# Patient Record
Sex: FEMALE | Race: WHITE | Employment: OTHER | URBAN - METROPOLITAN AREA
[De-identification: names, ages, dates, MRNs, and addresses within clinical notes are randomized per-mention and may not be internally consistent; named-entity substitution may affect disease eponyms.]

---

## 2017-02-10 ENCOUNTER — OFFICE VISIT (OUTPATIENT)
Dept: INTERNAL MEDICINE CLINIC | Age: 62
End: 2017-02-10

## 2017-02-10 VITALS
DIASTOLIC BLOOD PRESSURE: 80 MMHG | SYSTOLIC BLOOD PRESSURE: 124 MMHG | HEIGHT: 60 IN | RESPIRATION RATE: 16 BRPM | BODY MASS INDEX: 28.66 KG/M2 | TEMPERATURE: 98 F | HEART RATE: 77 BPM | WEIGHT: 146 LBS | OXYGEN SATURATION: 98 %

## 2017-02-10 DIAGNOSIS — J40 BRONCHITIS: Primary | ICD-10-CM

## 2017-02-10 RX ORDER — PREDNISONE 20 MG/1
20 TABLET ORAL
Qty: 5 TAB | Refills: 0 | Status: SHIPPED | OUTPATIENT
Start: 2017-02-10 | End: 2017-10-12 | Stop reason: ALTCHOICE

## 2017-02-10 RX ORDER — AZITHROMYCIN 250 MG/1
TABLET, FILM COATED ORAL
Qty: 6 TAB | Refills: 0 | Status: SHIPPED | OUTPATIENT
Start: 2017-02-10 | End: 2017-02-15

## 2017-02-10 NOTE — PROGRESS NOTES
Primary Care Doctor is:  Luis Malcolm MD    Cold Symptoms (productive cough, sore throat, ear pain, sinus pain and fatigue x 3 months )         HPI:  David Castro is a 64y.o. year old female who is here for an acute care visit and has the following concerns:    Symptoms started coughing up mucous. About December maybe September 2016. Mucous chest (points to upper chest) saw Dr. Brandi Buchanan in December. Now having congestion in sinuses. Pressure. No pain in sinuses. No fever. Sputum is yellow to green. Not coughing all night. Not a smoker. No hx of asthma.    meloxicam that she uses for her knee helps with the constant coughing. Tired of being sick. Assessment and Plan        1. Bronchitis  Persistent cough with green sputum,  Lungs on exam show no rales but decreased air entry with hyperreactivity inducing coughing spasms. 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 No fever or night sweats, but throat is sore. - azithromycin (ZITHROMAX) 250 mg tablet; TAKE 2 TABS THE FIRST DAY, AND THEN 1 TAB DAILY FOR 4 MORE DAYS  Dispense: 6 Tab; Refill: 0  - predniSONE (DELTASONE) 20 mg tablet; Take 1 Tab by mouth daily (with breakfast). Dispense: 5 Tab;  Refill: 0      Visit Vitals    /80 (BP 1 Location: Left arm, BP Patient Position: Sitting)    Pulse 77    Temp 98 °F (36.7 °C) (Oral)    Resp 16    Ht 5' (1.524 m)    Wt 146 lb (66.2 kg)    LMP 03/09/2010    SpO2 98%    BMI 28.51 kg/m2       Historical Data    Past Medical History   Diagnosis Date    Cervical polyp      no HPV    Genital herpes      in past,no flare up    Tuberculosis      TOOK FULL COURSE OF INH    Tumor cells        Past Surgical History   Procedure Laterality Date    Hx dilation and curettage       FOR UTERINE HEMORRHAGE    Hx gyn  5/2003     colposcopy       Outpatient Encounter Prescriptions as of 2/10/2017   Medication Sig Dispense Refill    azithromycin (ZITHROMAX) 250 mg tablet TAKE 2 TABS THE FIRST DAY, AND THEN 1 TAB DAILY FOR 4 MORE DAYS 6 Tab 0    predniSONE (DELTASONE) 20 mg tablet Take 1 Tab by mouth daily (with breakfast). 5 Tab 0    meloxicam (MOBIC) 15 mg tablet Take 15 mg by mouth daily.  LORATADINE (CLARITIN PO) Take  by mouth.  acetaminophen (TYLENOL EX STR ARTHRITIS PAIN) 500 mg tablet Take  by mouth every six (6) hours as needed.  trimethoprim-sulfamethoxazole (BACTRIM DS) 160-800 mg per tablet Take 1 tablet by mouth two (2) times a day.  magic mouthwash solution Magic mouth wash   Maalox  Lidocaine 2% viscous   Diphenhydramine oral solution     Pharmacy to mix equal portions of ingredients to a total volume as indicated in the dispense amount. 150 mL 0    traMADol (ULTRAM) 50 mg tablet Take 1 Tab by mouth nightly as needed for Pain. 30 Tab 0     No facility-administered encounter medications on file as of 2/10/2017. Allergies   Allergen Reactions    Aspirin Hives     But can take advil    Penicillins Rash    Percocet [Oxycodone-Acetaminophen] Unknown (comments)        Social History     Social History    Marital status: LEGALLY      Spouse name: N/A    Number of children: N/A    Years of education: N/A     Occupational History    Not on file. Social History Main Topics    Smoking status: Former Smoker     Packs/day: 1.00     Quit date: 5/4/2007    Smokeless tobacco: Never Used    Alcohol use Yes      Comment: SOCIAL    Drug use: No    Sexual activity: No     Other Topics Concern    Not on file     Social History Narrative        Review of Systems   Constitutional: Positive for chills. Negative for diaphoresis, fever, malaise/fatigue and weight loss. HENT: Positive for congestion and sore throat. Negative for ear discharge and ear pain. Eyes: Negative for blurred vision. Respiratory: Positive for cough, sputum production and wheezing. Negative for shortness of breath. Cardiovascular: Negative for chest pain and palpitations. Gastrointestinal: Negative for abdominal pain, nausea and vomiting. Genitourinary: Negative. Musculoskeletal: Negative. Negative for myalgias. Skin: Negative for itching and rash. Neurological: Negative for dizziness, focal weakness, weakness and headaches. Endo/Heme/Allergies: Negative for polydipsia. Physical Exam   Constitutional: She is oriented to person, place, and time and well-developed, well-nourished, and in no distress. No distress. HENT:   Right Ear: External ear normal.   Left Ear: External ear normal.   Mouth/Throat: Oropharyngeal exudate present. Nasal congestion present   Eyes: Conjunctivae are normal. Right eye exhibits no discharge. Left eye exhibits no discharge. No scleral icterus. Neck: Normal range of motion. Neck supple. No tracheal deviation present. No thyromegaly present. Cardiovascular: Normal rate and regular rhythm. Exam reveals no gallop and no friction rub. Pulmonary/Chest: Effort normal and breath sounds normal. No respiratory distress (decreased air entry). She has no wheezes. She has no rales. Abdominal: Soft. Bowel sounds are normal. She exhibits no distension. Musculoskeletal: Normal range of motion. She exhibits no edema or tenderness. Lymphadenopathy:     She has no cervical adenopathy (slight cervical neck tenderness but no discrete lymph nodes). Neurological: She is alert and oriented to person, place, and time. No cranial nerve deficit. Skin: Skin is warm and dry. No rash noted. Psychiatric: Memory and affect normal.   Vitals reviewed. Orders Placed This Encounter    azithromycin (ZITHROMAX) 250 mg tablet     Sig: TAKE 2 TABS THE FIRST DAY, AND THEN 1 TAB DAILY FOR 4 MORE DAYS     Dispense:  6 Tab     Refill:  0    predniSONE (DELTASONE) 20 mg tablet     Sig: Take 1 Tab by mouth daily (with breakfast).      Dispense:  5 Tab     Refill:  0        I have reviewed the patient's medical history in detail and updated the computerized patient record. We had a prolonged discussion about these complex clinical issues and went over the various important aspects to consider. All questions were answered. Advised her to call back or return to office if symptoms do not improve, change in nature, or persist.    She was given an after visit summary or informed of [facility] Access which includes patient instructions, diagnoses, current medications, & vitals. She expressed understanding with the diagnosis and plan.

## 2017-02-10 NOTE — PROGRESS NOTES
1. Have you been to the ER, urgent care clinic since your last visit? Hospitalized since your last visit? No    2. Have you seen or consulted any other health care providers outside of the [facility] since your last visit? Include any pap smears or colon screening.  No  Chief Complaint   Patient presents with    Cold Symptoms     productive cough, sore throat, ear pain, sinus pain and fatigue x 3 months

## 2017-04-21 DIAGNOSIS — Z01.419 WELL WOMAN EXAM: Primary | ICD-10-CM

## 2017-04-24 ENCOUNTER — HOSPITAL ENCOUNTER (OUTPATIENT)
Dept: MAMMOGRAPHY | Age: 62
Discharge: HOME | End: 2017-04-24
Attending: INTERNAL MEDICINE
Payer: COMMERCIAL

## 2017-04-24 DIAGNOSIS — Z12.31 VISIT FOR SCREENING MAMMOGRAM: ICD-10-CM

## 2017-04-24 PROCEDURE — 77067 SCR MAMMO BI INCL CAD: CPT

## 2017-10-12 ENCOUNTER — OFFICE VISIT (OUTPATIENT)
Dept: INTERNAL MEDICINE CLINIC | Age: 62
End: 2017-10-12

## 2017-10-12 VITALS
TEMPERATURE: 98.4 F | RESPIRATION RATE: 14 BRPM | WEIGHT: 148.2 LBS | OXYGEN SATURATION: 96 % | SYSTOLIC BLOOD PRESSURE: 120 MMHG | DIASTOLIC BLOOD PRESSURE: 80 MMHG | HEIGHT: 60 IN | BODY MASS INDEX: 29.09 KG/M2 | HEART RATE: 88 BPM

## 2017-10-12 DIAGNOSIS — M46.1 SI (SACROILIAC) JOINT INFLAMMATION (HCC): Primary | ICD-10-CM

## 2017-10-12 DIAGNOSIS — M70.61 TROCHANTERIC BURSITIS OF RIGHT HIP: ICD-10-CM

## 2017-10-12 NOTE — PROGRESS NOTES
59 yo female reports pain in low back as well as \"all over\". She was seen twice late last year for R shoulder pain and once for R knee pain. She saw an Ortho, Dr. Vanessa Grullon, who had her on Meloxicam 15 mg; she still had some of these, and has taken them sporadically. She indicates her current discomfort is R low back/sacral region, R lateral hip and into RLE. 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 The discomfort is interfering with good sleep at night. She is taking Turmuric, but was told by the pharmacist not to use Meloxicam and Turmeric at the same time. PE: WNWD WF   BP - 120/80   Hands - mild deformity of both thumb and 2nd metacarpal joints   SLR is neg   R hip - tender over trochanteric bursa   SLR is neg   Low back - point tender over R SI Joint   DTRs - diminished in L knee    Imp: R SI Joint Inflammation   R Trochanteric Bursitis     Plan: Meloxicam 15 mg - she has 8 pills left from the previous rx   Exercises for both problems   Heat or Ice   To Ortho if no improvement  _________________________  Expected course of current diagnosed problem(s) as well as expected progression and possible complications, and desired follow up with provider are discussed with patient. Patient is encouraged to be back in touch with any questions or concerns. Patient expresses understanding of plan of care. Patient is given AVS which includes diagnoses, current medications, vitals.

## 2017-10-12 NOTE — PATIENT INSTRUCTIONS
Hip Bursitis: Care Instructions  Your Care Instructions    Bursitis is inflammation of the bursa. A bursa is a small sac of fluid that cushions a joint and helps it move easily. A bursa sits between a bone in the hip and the muscles and tendons in the thigh and buttock. Injury or overuse of the hip can cause bursitis. Activities that can lead to bursitis include twisting and rapid joint movement. Bursitis can cause hip pain. Bursitis usually gets better if you avoid the activity that caused it. If pain lasts or gets worse despite home treatment, your doctor may draw fluid from the bursa through a needle. This may relieve your pain and help your doctor know if you have an infection. If so, your doctor will prescribe antibiotics. If you have inflammation only, you may get a corticosteroid shot to reduce swelling and pain. Sometimes surgery is needed to drain or remove the bursa. Follow-up care is a key part of your treatment and safety. Be sure to make and go to all appointments, and call your doctor if you are having problems. It's also a good idea to know your test results and keep a list of the medicines you take. How can you care for yourself at home? · Put ice or a cold pack on your hip for 10 to 20 minutes at a time. Put a thin cloth between the ice and your skin. · After 3 days of using ice, you may use heat on your hip. You can use a hot water bottle, a heating pad set on low, or a warm, moist towel. · Rest your hip. Stop any activities that cause pain. Switch to activities that do not stress your hip. · Take your medicines exactly as prescribed. Call your doctor if you think you are having a problem with your medicine. · Ask your doctor if you can take an over-the-counter pain medicine, such as acetaminophen (Tylenol), ibuprofen (Advil, Motrin), or naproxen (Aleve). Be safe with medicines. Read and follow all instructions on the label.   · To prevent stiffness, gently move the hip joint as much as you can without pain every day. As the pain gets better, keep doing range-of-motion exercises. Ask your doctor for exercises that will make the muscles around the hip joint stronger. Do these as directed. · You can slowly return to the activity that caused the pain, but do it with less effort until you can do it without pain or swelling. Be sure to warm up before and stretch after you do the activity. When should you call for help? Call your doctor now or seek immediate medical care if:  · You have a fever. · You have increased swelling or redness in your hip. · You cannot use your hip, or the pain in your hip gets worse. Watch closely for changes in your health, and be sure to contact your doctor if:  · You have pain for 2 weeks or longer despite home treatment. Where can you learn more? Go to http://darek-estuardo.info/. Enter Y024 in the search box to learn more about \"Hip Bursitis: Care Instructions. \"  Current as of: March 21, 2017  Content Version: 11.3  © 7237-9993 [employer]. Care instructions adapted under license by [employer] (which disclaims liability or warranty for this information). If you have questions about a medical condition or this instruction, always ask your healthcare professional. [address] any warranty or liability for your use of this information. _____________________________________________     Hip Bursitis: Exercises  Your Care Instructions  Here are some examples of typical rehabilitation exercises for your condition. Start each exercise slowly. Ease off the exercise if you start to have pain. Your doctor or physical therapist will tell you when you can start these exercises and which ones will work best for you. How to do the exercises  Hip rotator stretch    1. Lie on your back with both knees bent and your feet flat on the floor.   2. Put the ankle of your affected leg on your opposite thigh near your knee.  3. Use your hand to gently push your knee away from your body until you feel a gentle stretch around your hip. 4. Hold the stretch for 15 to 30 seconds. 5. Repeat 2 to 4 times. 6. Repeat steps 1 through 5, but this time use your hand to gently pull your knee toward your opposite shoulder. Iliotibial band stretch    1. Lean sideways against a wall. If you are not steady on your feet, hold on to a chair or counter. 2. Stand on the leg with the affected hip, with that leg close to the wall. Then cross your other leg in front of it. 3. Let your affected hip drop out to the side of your body and against wall. Then lean away from your affected hip until you feel a stretch. 4. Hold the stretch for 15 to 30 seconds. 5. Repeat 2 to 4 times. Straight-leg raises to the outside    1. Lie on your side, with your affected hip on top. 2. Tighten the front thigh muscles of your top leg to keep your knee straight. 3. Keep your hip and your leg straight in line with the rest of your body, and keep your knee pointing forward. Do not drop your hip back. 4. Lift your top leg straight up toward the ceiling, about 12 inches off the floor. Hold for about 6 seconds, then slowly lower your leg. 5. Repeat 8 to 12 times. Clamshell    1. Lie on your side, with your affected hip on top and your head propped on a pillow. Keep your feet and knees together and your knees bent. 2. Raise your top knee, but keep your feet together. Do not let your hips roll back. Your legs should open up like a clamshell. 3. Hold for 6 seconds. 4. Slowly lower your knee back down. Rest for 10 seconds. 5. Repeat 8 to 12 times. Follow-up care is a key part of your treatment and safety. Be sure to make and go to all appointments, and call your doctor if you are having problems. It's also a good idea to know your test results and keep a list of the medicines you take. Where can you learn more?   Go to http://darek-estuardo.info/. Enter U793 in the search box to learn more about \"Hip Bursitis: Exercises. \"  Current as of: March 21, 2017  Content Version: 11.3  © 2006-2017 [employer]. Care instructions adapted under license by [employer] (which disclaims liability or warranty for this information). If you have questions about a medical condition or this instruction, always ask your healthcare professional. [place] [address] any warranty or liability for your use of this information. _______________________________________________     Sacroiliac Joint Pain: Care Instructions  Your Care Instructions    The sacroiliac joints connect the spine and each side of the pelvis. These joints bear the weight and stress of your torso. This makes them easy to injure. Injury or overuse of these joints may cause low back pain. Stress on these joints can cause joint pain. Sacroiliac joint pain is more common in pregnant women. Certain kinds of arthritis also may cause this type of joint pain. Home treatment may help you feel better. So can avoiding activities that stress your back. Your doctor also may recommend physical therapy. This may include doing exercises and stretches to help with pain. You may also learn to use good posture. Follow-up care is a key part of your treatment and safety. Be sure to make and go to all appointments, and call your doctor if you are having problems. It's also a good idea to know your test results and keep a list of the medicines you take. How can you care for yourself at home? · Ask your doctor about light exercises that may help your back pain. Try to do light activity throughout the day. But make sure to take rests as needed. Find a comfortable position for rest, but don't stay in one position for too long. Avoid activities that cause pain.   · To apply heat, put a warm water bottle, a heating pad set on low, or a warm cloth on your back. Do not go to sleep with a heating pad on your skin. · Put ice or a cold pack on your back for 10 to 20 minutes at a time. Put a thin cloth between the ice and your skin. · If the doctor gave you a prescription medicine for pain, take it as prescribed. · If you are not taking a prescription pain medicine, ask your doctor if you can take an over-the-counter pain medicine, such as acetaminophen (Tylenol), ibuprofen (Advil, Motrin), or naproxen (Aleve). Read and follow all instructions on the label. Take pain medicines exactly as directed. · Do not take two or more pain medicines at the same time unless the doctor told you to. Many pain medicines have acetaminophen, which is Tylenol. Too much acetaminophen (Tylenol) can be harmful. · To prevent future back pain, do exercises to stretch and strengthen your back and stomach. Learn how to use good posture, safe lifting techniques, and proper body mechanics. When should you call for help? Call 911 anytime you think you may need emergency care. For example, call if:  · You are unable to move a leg at all. Call your doctor now or seek immediate medical care if:  · You have new or worse symptoms in your legs or buttocks. Symptoms may include:  ¨ Numbness or tingling. ¨ Weakness. ¨ Pain. · You lose bladder or bowel control. Watch closely for changes in your health, and be sure to contact your doctor if:  · You are not getting better as expected. Where can you learn more? Go to http://darek-estuardo.info/. Enter Q710 in the search box to learn more about \"Sacroiliac Joint Pain: Care Instructions. \"  Current as of: March 21, 2017  Content Version: 11.3  © 0563-7123 [employer]. Care instructions adapted under license by [employer] (which disclaims liability or warranty for this information).  If you have questions about a medical condition or this instruction, always ask your healthcare professional. [employer] disclaims any warranty or liability for your use of this information. ____________________________________________     Sacroiliac Pain: Exercises  Your Care Instructions  Here are some examples of typical rehabilitation exercises for your condition. Start each exercise slowly. Ease off the exercise if you start to have pain. Your doctor or physical therapist will tell you when you can start these exercises and which ones will work best for you. How to do the exercises  Knee-to-chest stretch    Do not do the knee-to-chest exercise if it causes or increases back or leg pain. 7. Lie on your back with your knees bent and your feet flat on the floor. You can put a small pillow under your head and neck if it is more comfortable. 8. Grasp your hands under one knee and bring the knee to your chest, keeping the other foot flat on the floor. 9. Keep your lower back pressed to the floor. Hold for at least 15 to 30 seconds. 10. Relax and lower the knee to the starting position. Repeat with the other leg. 11. Repeat 2 to 4 times with each leg. 12. To get more stretch, keep your other leg flat on the floor while pulling your knee to your chest.  Bridging    6. Lie on your back with both knees bent. Your knees should be bent about 90 degrees. 7. Tighten your belly muscles by pulling in your belly button toward your spine. Then push your feet into the floor, squeeze your buttocks, and lift your hips off the floor until your shoulders, hips, and knees are all in a straight line. 8. Hold for about 6 seconds as you continue to breathe normally, and then slowly lower your hips back down to the floor and rest for up to 10 seconds. 9. Repeat 8 to 12 times. Hip extension    6. Get down on your hands and knees on the floor. 7. Keeping your back and neck straight, lift one leg straight out behind you. When you lift your leg, keep your hips level.  Don't let your back twist, and don't let your hip drop toward the floor.  8. Hold for 6 seconds. Repeat 8 to 12 times with each leg. 9. If you feel steady and strong when you do this exercise, you can make it more difficult. To do this, when you lift your leg, also lift the opposite arm straight out in front of you. For example, lift the left leg and the right arm at the same time. (This is sometimes called the \"bird dog exercise. \") Hold for 6 seconds, and repeat 8 to 12 times on each side. Clamshell    6. Lie on your side with a pillow under your head. Keep your feet and knees together and your knees bent. 7. Raise your top knee, but keep your feet together. Do not let your hips roll back. Your legs should open up like a clamshell. 8. Hold for 6 seconds. 9. Slowly lower your knee back down. Rest for 10 seconds. 10. Repeat 8 to 12 times. 11. Switch to your other side and repeat steps 1 through 5. Hamstring wall stretch    1. Lie on your back in a doorway, with one leg through the open door. 2. Slide your affected leg up the wall to straighten your knee. You should feel a gentle stretch down the back of your leg. ¨ Do not arch your back. ¨ Do not bend either knee. ¨ Keep one heel touching the floor and the other heel touching the wall. Do not point your toes. 3. Hold the stretch for at least 1 minute to begin. Then try to lengthen the time you hold the stretch to as long as 6 minutes. 4. Switch legs, and repeat steps 1 through 3.  5. Repeat 2 to 4 times. If you do not have a place to do this exercise in a doorway, there is another way to do it:  1. Lie on your back, and bend one knee. 2. Loop a towel under the ball and toes of that foot, and hold the ends of the towel in your hands. 3. Straighten your knee, and slowly pull back on the towel. You should feel a gentle stretch down the back of your leg. 4. Switch legs, and repeat steps 1 through 3.  5. Repeat 2 to 4 times. Lower abdominal strengthening    1.  Lie on your back with your knees bent and your feet flat on the floor. 2. Tighten your belly muscles by pulling your belly button in toward your spine. 3. Lift one foot off the floor and bring your knee toward your chest, so that your knee is straight above your hip and your leg is bent like the letter \"L. \"  4. Lift the other knee up to the same position. 5. Lower one leg at a time to the starting position. 6. Keep alternating legs until you have lifted each leg 8 to 12 times. 7. Be sure to keep your belly muscles tight and your back still as you are moving your legs. Be sure to breathe normally. Piriformis stretch    1. Lie on your back with your legs straight. 2. Lift your affected leg, and bend your knee. With your opposite hand, reach across your body, and then gently pull your knee toward your opposite shoulder. 3. Hold the stretch for 15 to 30 seconds. 4. Switch legs and repeat steps 1 through 3.  5. Repeat 2 to 4 times. Follow-up care is a key part of your treatment and safety. Be sure to make and go to all appointments, and call your doctor if you are having problems. It's also a good idea to know your test results and keep a list of the medicines you take. Where can you learn more? Go to http://darek-estuardo.info/. Enter G403 in the search box to learn more about \"Sacroiliac Pain: Exercises. \"  Current as of: March 21, 2017  Content Version: 11.3  © 8042-7082 [employer]. Care instructions adapted under license by [employer] (which disclaims liability or warranty for this information). If you have questions about a medical condition or this instruction, always ask your healthcare professional. [place] [address] any warranty or liability for your use of this information.

## 2017-10-12 NOTE — LETTER
NOTIFICATION RETURN TO WORK / SCHOOL 
 
10/12/2017 3:32 PM 
 
Ms. Johnnie Aguilar [address] [id number] To Whom It May Concern: 
 
Johnnie Aguilar is currently under the care of Fabio Aayla. She will return to work/school on: Fahad, October 15, 2017. If there are questions or concerns please have the patient contact our office. Sincerely, Jcalros Mclaughlin NP

## 2017-10-12 NOTE — MR AVS SNAPSHOT
Visit Information Date & Time Provider Department Dept. Phone Encounter #  
 10/12/2017  2:40 PM Janice Marte NP [place] [id number] [facility] [phone] [id number] Upcoming Health Maintenance Date Due Hepatitis C Screening 1955 DTaP/Tdap/Td series (1 - Tdap) 4/18/1976 PAP AKA CERVICAL CYTOLOGY 12/1/2018 BREAST CANCER SCRN MAMMOGRAM 4/24/2019 COLONOSCOPY 6/30/2027 Allergies as of 10/12/2017  Review Complete On: 10/12/2017 By: Janice Marte NP Severity Noted Reaction Type Reactions Percocet [Oxycodone-acetaminophen] Medium 05/04/2009   Side Effect Rash After being on it for a month Aspirin  05/04/2009    Hives But can take advil Penicillins  10/07/2009    Rash Current Immunizations  Never Reviewed No immunizations on file. Not reviewed this visit You Were Diagnosed With   
  
 Codes Comments SI (sacroiliac) joint inflammation (HCC)    -  Primary ICD-10-CM: M46.1 ICD-9-CM: 720.2 Trochanteric bursitis of right hip     ICD-10-CM: M70.61 ICD-9-CM: 726.5 Vitals BP Pulse Temp Resp Height(growth percentile) Weight(growth percentile) 120/80 (BP 1 Location: Left arm, BP Patient Position: Sitting) 88 98.4 °F (36.9 °C) (Oral) 14 5' (1.524 m) 148 lb 3.2 oz (67.2 kg) LMP SpO2 BMI OB Status Smoking Status 03/09/2010 96% 28.94 kg/m2 Postmenopausal Former Smoker BMI and BSA Data Body Mass Index Body Surface Area  
 28.94 kg/m 2 1.69 m 2 Preferred Pharmacy Pharmacy Name Phone [id number] [place] [address] [phone] Your Updated Medication List  
  
   
This list is accurate as of: 10/12/17  3:32 PM.  Always use your most recent med list.  
  
  
  
  
 meloxicam 15 mg tablet Commonly known as:  MOBIC Take 15 mg by mouth daily. TURMERIC ROOT EXTRACT PO Take  by mouth. TYLENOL EX STR ARTHRITIS PAIN 500 mg tablet Generic drug:  acetaminophen Take  by mouth every six (6) hours as needed. WOMEN'S DAILY MULTIVITAMIN PO Take  by mouth. Patient Instructions Hip Bursitis: Care Instructions Your Care Instructions Bursitis is inflammation of the bursa. A bursa is a small sac of fluid that cushions a joint and helps it move easily. A bursa sits between a bone in the hip and the muscles and tendons in the thigh and buttock. Injury or overuse of the hip can cause bursitis. Activities that can lead to bursitis include twisting and rapid joint movement. Bursitis can cause hip pain. Bursitis usually gets better if you avoid the activity that caused it. If pain lasts or gets worse despite home treatment, your doctor may draw fluid from the bursa through a needle. This may relieve your pain and help your doctor know if you have an infection. If so, your doctor will prescribe antibiotics. If you have inflammation only, you may get a corticosteroid shot to reduce swelling and pain. Sometimes surgery is needed to drain or remove the bursa. Follow-up care is a key part of your treatment and safety. Be sure to make and go to all appointments, and call your doctor if you are having problems. It's also a good idea to know your test results and keep a list of the medicines you take. How can you care for yourself at home? · Put ice or a cold pack on your hip for 10 to 20 minutes at a time. Put a thin cloth between the ice and your skin. · After 3 days of using ice, you may use heat on your hip. You can use a hot water bottle, a heating pad set on low, or a warm, moist towel. · Rest your hip. Stop any activities that cause pain. Switch to activities that do not stress your hip. · Take your medicines exactly as prescribed. Call your doctor if you think you are having a problem with your medicine. · Ask your doctor if you can take an over-the-counter pain medicine, such as acetaminophen (Tylenol), ibuprofen (Advil, Motrin), or naproxen (Aleve). Be safe with medicines. Read and follow all instructions on the label. · To prevent stiffness, gently move the hip joint as much as you can without pain every day. As the pain gets better, keep doing range-of-motion exercises. Ask your doctor for exercises that will make the muscles around the hip joint stronger. Do these as directed. · You can slowly return to the activity that caused the pain, but do it with less effort until you can do it without pain or swelling. Be sure to warm up before and stretch after you do the activity. When should you call for help? Call your doctor now or seek immediate medical care if: 
· You have a fever. · You have increased swelling or redness in your hip. · You cannot use your hip, or the pain in your hip gets worse. Watch closely for changes in your health, and be sure to contact your doctor if: 
· You have pain for 2 weeks or longer despite home treatment. Where can you learn more? Go to http://darek-estuardo.info/. Enter A688 in the search box to learn more about \"Hip Bursitis: Care Instructions. \" Current as of: March 21, 2017 Content Version: 11.3 © 0307-3283 [employer]. Care instructions adapted under license by [employer] (which disclaims liability or warranty for this information). If you have questions about a medical condition or this instruction, always ask your healthcare professional. [address] any warranty or liability for your use of this information. _____________________________________________ Hip Bursitis: Exercises Your Care Instructions Here are some examples of typical rehabilitation exercises for your condition. Start each exercise slowly. Ease off the exercise if you start to have pain. Your doctor or physical therapist will tell you when you can start these exercises and which ones will work best for you. How to do the exercises Hip rotator stretch 1. Lie on your back with both knees bent and your feet flat on the floor. 2. Put the ankle of your affected leg on your opposite thigh near your knee. 3. Use your hand to gently push your knee away from your body until you feel a gentle stretch around your hip. 4. Hold the stretch for 15 to 30 seconds. 5. Repeat 2 to 4 times. 6. Repeat steps 1 through 5, but this time use your hand to gently pull your knee toward your opposite shoulder. Iliotibial band stretch 1. Lean sideways against a wall. If you are not steady on your feet, hold on to a chair or counter. 2. Stand on the leg with the affected hip, with that leg close to the wall. Then cross your other leg in front of it. 3. Let your affected hip drop out to the side of your body and against wall. Then lean away from your affected hip until you feel a stretch. 4. Hold the stretch for 15 to 30 seconds. 5. Repeat 2 to 4 times. Straight-leg raises to the outside 1. Lie on your side, with your affected hip on top. 2. Tighten the front thigh muscles of your top leg to keep your knee straight. 3. Keep your hip and your leg straight in line with the rest of your body, and keep your knee pointing forward. Do not drop your hip back. 4. Lift your top leg straight up toward the ceiling, about 12 inches off the floor. Hold for about 6 seconds, then slowly lower your leg. 5. Repeat 8 to 12 times. Clamshell 1. Lie on your side, with your affected hip on top and your head propped on a pillow. Keep your feet and knees together and your knees bent. 2. Raise your top knee, but keep your feet together. Do not let your hips roll back. Your legs should open up like a clamshell. 3. Hold for 6 seconds. 4. Slowly lower your knee back down. Rest for 10 seconds. 5. Repeat 8 to 12 times. Follow-up care is a key part of your treatment and safety. Be sure to make and go to all appointments, and call your doctor if you are having problems. It's also a good idea to know your test results and keep a list of the medicines you take. Where can you learn more? Go to http://darek-estuardo.info/. Enter P625 in the search box to learn more about \"Hip Bursitis: Exercises. \" Current as of: March 21, 2017 Content Version: 11.3 © 2006-2017 [employer]. Care instructions adapted under license by [employer] (which disclaims liability or warranty for this information). If you have questions about a medical condition or this instruction, always ask your healthcare professional. [address] any warranty or liability for your use of this information. _______________________________________________ Sacroiliac Joint Pain: Care Instructions Your Care Instructions The sacroiliac joints connect the spine and each side of the pelvis. These joints bear the weight and stress of your torso. This makes them easy to injure. Injury or overuse of these joints may cause low back pain. Stress on these joints can cause joint pain. Sacroiliac joint pain is more common in pregnant women. Certain kinds of arthritis also may cause this type of joint pain. Home treatment may help you feel better. So can avoiding activities that stress your back. Your doctor also may recommend physical therapy. This may include doing exercises and stretches to help with pain. You may also learn to use good posture. Follow-up care is a key part of your treatment and safety. Be sure to make and go to all appointments, and call your doctor if you are having problems. It's also a good idea to know your test results and keep a list of the medicines you take. How can you care for yourself at home? · Ask your doctor about light exercises that may help your back pain.  Try to do light activity throughout the day. But make sure to take rests as needed. Find a comfortable position for rest, but don't stay in one position for too long. Avoid activities that cause pain. · To apply heat, put a warm water bottle, a heating pad set on low, or a warm cloth on your back. Do not go to sleep with a heating pad on your skin. · Put ice or a cold pack on your back for 10 to 20 minutes at a time. Put a thin cloth between the ice and your skin. · If the doctor gave you a prescription medicine for pain, take it as prescribed. · If you are not taking a prescription pain medicine, ask your doctor if you can take an over-the-counter pain medicine, such as acetaminophen (Tylenol), ibuprofen (Advil, Motrin), or naproxen (Aleve). Read and follow all instructions on the label. Take pain medicines exactly as directed. · Do not take two or more pain medicines at the same time unless the doctor told you to. Many pain medicines have acetaminophen, which is Tylenol. Too much acetaminophen (Tylenol) can be harmful. · To prevent future back pain, do exercises to stretch and strengthen your back and stomach. Learn how to use good posture, safe lifting techniques, and proper body mechanics. When should you call for help? Call 911 anytime you think you may need emergency care. For example, call if: 
· You are unable to move a leg at all. Call your doctor now or seek immediate medical care if: 
· You have new or worse symptoms in your legs or buttocks. Symptoms may include: ¨ Numbness or tingling. ¨ Weakness. ¨ Pain. · You lose bladder or bowel control. Watch closely for changes in your health, and be sure to contact your doctor if: 
· You are not getting better as expected. Where can you learn more? Go to http://darek-estuardo.info/. Enter D717 in the search box to learn more about \"Sacroiliac Joint Pain: Care Instructions. \" Current as of: March 21, 2017 Content Version: 11.3 © 2269-5075 [employer]. Care instructions adapted under license by [employer] (which disclaims liability or warranty for this information). If you have questions about a medical condition or this instruction, always ask your healthcare professional. [address] any warranty or liability for your use of this information. ____________________________________________ Sacroiliac Pain: Exercises Your Care Instructions Here are some examples of typical rehabilitation exercises for your condition. Start each exercise slowly. Ease off the exercise if you start to have pain. Your doctor or physical therapist will tell you when you can start these exercises and which ones will work best for you. How to do the exercises Knee-to-chest stretch Do not do the knee-to-chest exercise if it causes or increases back or leg pain. 7. Lie on your back with your knees bent and your feet flat on the floor. You can put a small pillow under your head and neck if it is more comfortable. 8. Grasp your hands under one knee and bring the knee to your chest, keeping the other foot flat on the floor. 9. Keep your lower back pressed to the floor. Hold for at least 15 to 30 seconds. 10. Relax and lower the knee to the starting position. Repeat with the other leg. 11. Repeat 2 to 4 times with each leg. 12. To get more stretch, keep your other leg flat on the floor while pulling your knee to your chest. 
Bridging 6. Lie on your back with both knees bent. Your knees should be bent about 90 degrees. 7. Tighten your belly muscles by pulling in your belly button toward your spine. Then push your feet into the floor, squeeze your buttocks, and lift your hips off the floor until your shoulders, hips, and knees are all in a straight line. 8. Hold for about 6 seconds as you continue to breathe normally, and then slowly lower your hips back down to the floor and rest for up to 10 seconds. 9. Repeat 8 to 12 times. Hip extension 6. Get down on your hands and knees on the floor. 7. Keeping your back and neck straight, lift one leg straight out behind you. When you lift your leg, keep your hips level. Don't let your back twist, and don't let your hip drop toward the floor. 8. Hold for 6 seconds. Repeat 8 to 12 times with each leg. 9. If you feel steady and strong when you do this exercise, you can make it more difficult. To do this, when you lift your leg, also lift the opposite arm straight out in front of you. For example, lift the left leg and the right arm at the same time. (This is sometimes called the \"bird dog exercise. \") Hold for 6 seconds, and repeat 8 to 12 times on each side. Clamshell 6. Lie on your side with a pillow under your head. Keep your feet and knees together and your knees bent. 7. Raise your top knee, but keep your feet together. Do not let your hips roll back. Your legs should open up like a clamshell. 8. Hold for 6 seconds. 9. Slowly lower your knee back down. Rest for 10 seconds. 10. Repeat 8 to 12 times. 11. Switch to your other side and repeat steps 1 through 5. Hamstring wall stretch 1. Lie on your back in a doorway, with one leg through the open door. 2. Slide your affected leg up the wall to straighten your knee. You should feel a gentle stretch down the back of your leg. ¨ Do not arch your back. ¨ Do not bend either knee. ¨ Keep one heel touching the floor and the other heel touching the wall. Do not point your toes. 3. Hold the stretch for at least 1 minute to begin. Then try to lengthen the time you hold the stretch to as long as 6 minutes. 4. Switch legs, and repeat steps 1 through 3. 
5. Repeat 2 to 4 times. If you do not have a place to do this exercise in a doorway, there is another way to do it: 1. Lie on your back, and bend one knee. 2. Loop a towel under the ball and toes of that foot, and hold the ends of the towel in your hands. 3. Straighten your knee, and slowly pull back on the towel. You should feel a gentle stretch down the back of your leg. 4. Switch legs, and repeat steps 1 through 3. 
5. Repeat 2 to 4 times. Lower abdominal strengthening 1. Lie on your back with your knees bent and your feet flat on the floor. 2. Tighten your belly muscles by pulling your belly button in toward your spine. 3. Lift one foot off the floor and bring your knee toward your chest, so that your knee is straight above your hip and your leg is bent like the letter \"L. \" 
4. Lift the other knee up to the same position. 5. Lower one leg at a time to the starting position. 6. Keep alternating legs until you have lifted each leg 8 to 12 times. 7. Be sure to keep your belly muscles tight and your back still as you are moving your legs. Be sure to breathe normally. Piriformis stretch 1. Lie on your back with your legs straight. 2. Lift your affected leg, and bend your knee. With your opposite hand, reach across your body, and then gently pull your knee toward your opposite shoulder. 3. Hold the stretch for 15 to 30 seconds. 4. Switch legs and repeat steps 1 through 3. 
5. Repeat 2 to 4 times. Follow-up care is a key part of your treatment and safety. Be sure to make and go to all appointments, and call your doctor if you are having problems. It's also a good idea to know your test results and keep a list of the medicines you take. Where can you learn more? Go to http://darek-estuardo.info/. Enter H280 in the search box to learn more about \"Sacroiliac Pain: Exercises. \" Current as of: March 21, 2017 Content Version: 11.3 © 4857-3620 [employer]. Care instructions adapted under license by [employer] (which disclaims liability or warranty for this information).  If you have questions about a medical condition or this instruction, always ask your healthcare professional. [employer] disclaims any warranty or liability for your use of this information. Introducing [facility]! Julianne Rockwell introduces [employer] patient portal. Now you can access parts of your medical record, email your doctor's office, and request medication refills online. 1. In your internet browser, go to [email or web address] 2. Click on the First Time User? Click Here link in the Sign In box. You will see the New Member Sign Up page. 3. Enter your [employer] Access Code exactly as it appears below. You will not need to use this code after youve completed the sign-up process. If you do not sign up before the expiration date, you must request a new code. · [employer] Access Code: [email or web address] Expires: 1/10/2018  3:32 PM 
 
4. Enter the last four digits of your Social Security Number (xxxx) and Date of Birth (mm/dd/yyyy) as indicated and click Submit. You will be taken to the next sign-up page. 5. Create a [employer] ID. This will be your [employer] login ID and cannot be changed, so think of one that is secure and easy to remember. 6. Create a [employer] password. You can change your password at any time. 7. Enter your Password Reset Question and Answer. This can be used at a later time if you forget your password. 8. Enter your e-mail address. You will receive e-mail notification when new information is available in [address] 9. Click Sign Up. You can now view and download portions of your medical record. 10. Click the Download Summary menu link to download a portable copy of your medical information. If you have questions, please visit the Frequently Asked Questions section of the [employer] website. Remember, [employer] is NOT to be used for urgent needs. For medical emergencies, dial 911. Now available from your iPhone and Android! Please provide this summary of care documentation to your next provider.

## 2017-10-12 NOTE — PROGRESS NOTES
Chief Complaint   Patient presents with    Back Pain     Pt c/o lower back pain, but states is all over. 1. Have you been to the ER, urgent care clinic since your last visit? Hospitalized since your last visit? No    2. Have you seen or consulted any other health care providers outside of the [address] [place] [address] since your last visit? Include any pap smears or colon screening.  No]

## 2017-11-21 ENCOUNTER — OFFICE VISIT (OUTPATIENT)
Dept: INTERNAL MEDICINE CLINIC | Age: 62
End: 2017-11-21

## 2017-11-21 VITALS
RESPIRATION RATE: 18 BRPM | DIASTOLIC BLOOD PRESSURE: 80 MMHG | SYSTOLIC BLOOD PRESSURE: 120 MMHG | HEART RATE: 78 BPM | BODY MASS INDEX: 28.86 KG/M2 | HEIGHT: 60 IN | WEIGHT: 147 LBS | TEMPERATURE: 97.2 F | OXYGEN SATURATION: 98 %

## 2017-11-21 DIAGNOSIS — J34.89 NOSTRIL INFECTION: Primary | ICD-10-CM

## 2017-11-21 RX ORDER — SULFAMETHOXAZOLE AND TRIMETHOPRIM 800; 160 MG/1; MG/1
1 TABLET ORAL 2 TIMES DAILY
Qty: 10 TAB | Refills: 0 | Status: SHIPPED | OUTPATIENT
Start: 2017-11-21 | End: 2017-11-26

## 2017-11-21 NOTE — PROGRESS NOTES
59 yo female awakened from sleep on 11/19 by soreness in L nostril. Later she noted swelling on the outside of the nose more proximally but no redness. She felt slightly feverish. The area was very tender, so she used a Q-tip with hot water to rub the pimple until it popped and drained pus. She works in an elementary school. She had noted some crusting of the nostril before onset of the pimple, but no head cold. She has had a drippy nose/allergies. She now has a smaller pimple in same nostril. PE: WNWD WF   T - 97.2   Phar - nl   TMs - dull   Nose - L nostril near os - small superficial raised area w/o surrounding redness   Neck - no nodes    Imp: Nasal Skin Infection    Plan: Bactrim for 5 days (she is sensitive to PCN, and thinks she didn't tolerate Keflex in the past)  _______________________  Expected course of current diagnosed problem(s) as well as expected progression and possible complications, and desired follow up with provider are discussed with patient. Patient is encouraged to be back in touch with any questions or concerns. Patient expresses understanding of plan of care. Patient is given AVS which includes diagnoses, current medications, vitals.

## 2017-11-21 NOTE — MR AVS SNAPSHOT
Visit Information Date & Time Provider Department Dept. Phone Encounter #  
 11/21/2017  9:40 AM [facility] [phone] Upcoming Health Maintenance Date Due Hepatitis C Screening 1955 DTaP/Tdap/Td series (1 - Tdap) 4/18/1976 PAP AKA CERVICAL CYTOLOGY 12/1/2018 BREAST CANCER SCRN MAMMOGRAM 4/24/2019 COLONOSCOPY 6/30/2027 Allergies as of 11/21/2017  Review Complete On: 11/21/2017 By: Tonya Dupont NP Severity Noted Reaction Type Reactions Percocet [Oxycodone-acetaminophen] Medium 05/04/2009   Side Effect Rash After being on it for a month Aspirin  05/04/2009    Hives But can take advil Penicillins  10/07/2009    Rash Current Immunizations  Never Reviewed No immunizations on file. Not reviewed this visit You Were Diagnosed With   
  
 Codes Comments Nostril infection    -  Primary ICD-10-CM: W72.81 ICD-9-CM: 478.19 Vitals BP Pulse Temp Resp Height(growth percentile) Weight(growth percentile) 120/80 (BP 1 Location: Left arm, BP Patient Position: Sitting) 78 97.2 °F (36.2 °C) (Oral) 18 5' (1.524 m) 147 lb (66.7 kg) LMP SpO2 BMI OB Status Smoking Status 03/09/2010 98% 28.71 kg/m2 Postmenopausal Former Smoker Vitals History BMI and BSA Data Body Mass Index Body Surface Area 28.71 kg/m 2 1.68 m 2 Preferred Pharmacy Pharmacy Name Phone [facility] #[id number] Dylon Ayala [facility] [phone] Your Updated Medication List  
  
   
This list is accurate as of: 11/21/17 10:25 AM.  Always use your most recent med list.  
  
  
  
  
 trimethoprim-sulfamethoxazole 160-800 mg per tablet Commonly known as:  BACTRIM DS, SEPTRA DS Take 1 Tab by mouth two (2) times a day for 5 days. Indications: Skin and Skin Structure Infection TURMERIC ROOT EXTRACT PO Take  by mouth. TYLENOL EX STR ARTHRITIS PAIN 500 mg tablet Generic drug:  acetaminophen Take  by mouth every six (6) hours as needed. WOMEN'S DAILY MULTIVITAMIN PO Take  by mouth. Prescriptions Sent to Pharmacy Refills  
 trimethoprim-sulfamethoxazole (BACTRIM DS, SEPTRA DS) 160-800 mg per tablet 0 Sig: Take 1 Tab by mouth two (2) times a day for 5 days. Indications: Skin and Skin Structure Infection Class: Normal  
 Pharmacy: [address] [place] [address] Jesenia Ingram  #: [phone] Route: Oral  
  
Introducing [facility]! Pablo Floydry introduces [employer] patient portal. Now you can access parts of your medical record, email your doctor's office, and request medication refills online. 1. In your internet browser, go to [email or web address]. [facility]/[employer] 2. Click on the First Time User? Click Here link in the Sign In box. You will see the New Member Sign Up page. 3. Enter your [employer] Access Code exactly as it appears below. You will not need to use this code after youve completed the sign-up process. If you do not sign up before the expiration date, you must request a new code. · [employer] Access Code: [email or web address] Expires: 1/10/2018  2:32 PM 
 
4. Enter the last four digits of your Social Security Number (xxxx) and Date of Birth (mm/dd/yyyy) as indicated and click Submit. You will be taken to the next sign-up page. 5. Create a [employer] ID. This will be your [employer] login ID and cannot be changed, so think of one that is secure and easy to remember. 6. Create a [employer] password. You can change your password at any time. 7. Enter your Password Reset Question and Answer. This can be used at a later time if you forget your password. 8. Enter your e-mail address. You will receive e-mail notification when new information is available in [address] 9. Click Sign Up. You can now view and download portions of your medical record. 10. Click the Download Summary menu link to download a portable copy of your medical information. If you have questions, please visit the Frequently Asked Questions section of the [employer] website. Remember, [employer] is NOT to be used for urgent needs. For medical emergencies, dial 911. Now available from your iPhone and Android! Please provide this summary of care documentation to your next provider. Your primary care clinician is listed as Wanda Peterson. If you have any questions after today's visit, please call [phone].

## 2017-11-21 NOTE — PROGRESS NOTES
Chief Complaint   Patient presents with    Skin Problem     Pt states pimple on nose showed up sunday left nostril. 1. Have you been to the ER, urgent care clinic since your last visit? No  Hospitalized since your last visit? No    2. Have you seen or consulted any other health care providers outside of the [address] [place] [address] since your last visit? No  Include any pap smears or colon screening.  No

## 2020-09-22 ENCOUNTER — OFFICE VISIT (OUTPATIENT)
Dept: PRIMARY CARE CLINIC | Age: 65
End: 2020-09-22
Payer: MEDICARE

## 2020-09-22 VITALS
WEIGHT: 150.6 LBS | OXYGEN SATURATION: 94 % | HEART RATE: 73 BPM | HEIGHT: 62 IN | DIASTOLIC BLOOD PRESSURE: 79 MMHG | SYSTOLIC BLOOD PRESSURE: 114 MMHG | TEMPERATURE: 98.4 F | BODY MASS INDEX: 27.71 KG/M2 | RESPIRATION RATE: 16 BRPM

## 2020-09-22 DIAGNOSIS — N64.4 BREAST PAIN, LEFT: Primary | ICD-10-CM

## 2020-09-22 DIAGNOSIS — Z11.59 NEED FOR HEPATITIS C SCREENING TEST: ICD-10-CM

## 2020-09-22 DIAGNOSIS — R73.02 IGT (IMPAIRED GLUCOSE TOLERANCE): ICD-10-CM

## 2020-09-22 DIAGNOSIS — F32.9 REACTIVE DEPRESSION: ICD-10-CM

## 2020-09-22 DIAGNOSIS — E78.2 MIXED HYPERLIPIDEMIA: ICD-10-CM

## 2020-09-22 DIAGNOSIS — K04.7 INFECTION OF TOOTH: ICD-10-CM

## 2020-09-22 PROCEDURE — 3288F FALL RISK ASSESSMENT DOCD: CPT | Performed by: INTERNAL MEDICINE

## 2020-09-22 PROCEDURE — 1090F PRES/ABSN URINE INCON ASSESS: CPT | Performed by: INTERNAL MEDICINE

## 2020-09-22 PROCEDURE — G8427 DOCREV CUR MEDS BY ELIG CLIN: HCPCS | Performed by: INTERNAL MEDICINE

## 2020-09-22 PROCEDURE — 3017F COLORECTAL CA SCREEN DOC REV: CPT | Performed by: INTERNAL MEDICINE

## 2020-09-22 PROCEDURE — G8419 CALC BMI OUT NRM PARAM NOF/U: HCPCS | Performed by: INTERNAL MEDICINE

## 2020-09-22 PROCEDURE — 1100F PTFALLS ASSESS-DOCD GE2>/YR: CPT | Performed by: INTERNAL MEDICINE

## 2020-09-22 PROCEDURE — G9899 SCRN MAM PERF RSLTS DOC: HCPCS | Performed by: INTERNAL MEDICINE

## 2020-09-22 PROCEDURE — G8510 SCR DEP NEG, NO PLAN REQD: HCPCS | Performed by: INTERNAL MEDICINE

## 2020-09-22 PROCEDURE — G8536 NO DOC ELDER MAL SCRN: HCPCS | Performed by: INTERNAL MEDICINE

## 2020-09-22 PROCEDURE — 99203 OFFICE O/P NEW LOW 30 MIN: CPT | Performed by: INTERNAL MEDICINE

## 2020-09-22 PROCEDURE — G8400 PT W/DXA NO RESULTS DOC: HCPCS | Performed by: INTERNAL MEDICINE

## 2020-09-22 NOTE — PROGRESS NOTES
Chief Complaint   Patient presents with    Establish Care    Breast Mass     states that she noticed place on breast about two weeks ago denies any discharge or leaking.
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
## 2020-09-29 ENCOUNTER — VIRTUAL VISIT (OUTPATIENT)
Dept: PRIMARY CARE CLINIC | Age: 65
End: 2020-09-29
Payer: MEDICARE

## 2020-09-29 DIAGNOSIS — R10.9 ABDOMINAL PAIN: ICD-10-CM

## 2020-09-29 DIAGNOSIS — K57.92 DIVERTICULITIS: Primary | ICD-10-CM

## 2020-09-29 PROCEDURE — 99213 OFFICE O/P EST LOW 20 MIN: CPT | Performed by: NURSE PRACTITIONER

## 2020-09-29 RX ORDER — METRONIDAZOLE 500 MG/1
500 TABLET ORAL 3 TIMES DAILY
Qty: 30 TAB | Refills: 0 | Status: SHIPPED | OUTPATIENT
Start: 2020-09-29 | End: 2020-10-09

## 2020-09-29 RX ORDER — CIPROFLOXACIN 500 MG/1
500 TABLET ORAL 2 TIMES DAILY
Qty: 20 TAB | Refills: 0 | Status: SHIPPED | OUTPATIENT
Start: 2020-09-29 | End: 2020-10-07 | Stop reason: SINTOL

## 2020-09-29 NOTE — PROGRESS NOTES
[facility]   [address], [address] [facility]  P: [phone]  F: [phone]    SUBJECTIVE   Marcellina Kehr is a 72 y.o. female who is seen over telehealth for Other (GI concerns). HPI:  Presents with belching, flatus, back pain, constipation that resolved with a laxative yesterday. She suspects a diverticulitis flare-up for past couple of days. .  Colonoscopy in 2017 showed moderate diverticulitis. Feels her diet has triggered the flareup including too much cheese and enchiladas.     Patient Active Problem List    Diagnosis    H/O TB (tuberculosis)          Past Medical History:   Diagnosis Date    Cervical polyp     no HPV    Genital herpes     in past,no flare up    Tuberculosis     TOOK FULL COURSE OF INH    Tumor cells      Past Surgical History:   Procedure Laterality Date    HX DILATION AND CURETTAGE      FOR UTERINE HEMORRHAGE - spontaneous     HX GYN  2003    colposcopy - polyps    HX GYN      A1     Social History     Socioeconomic History    Marital status:      Spouse name: Not on file    Number of children: Not on file    Years of education: Not on file    Highest education level: Not on file   Occupational History    Not on file   Social Needs    Financial resource strain: Not on file    Food insecurity     Worry: Not on file     Inability: Not on file    Transportation needs     Medical: Not on file     Non-medical: Not on file   Tobacco Use    Smoking status: Former Smoker     Packs/day: 1.00     Last attempt to quit: 2007     Years since quittin.4    Smokeless tobacco: Never Used   Substance and Sexual Activity    Alcohol use: Yes     Comment: SOCIAL    Drug use: No    Sexual activity: Never     Partners: Male   Lifestyle    Physical activity     Days per week: Not on file     Minutes per session: Not on file    Stress: Not on file   Relationships    Social connections     Talks on phone: Not on file Gets together: Not on file     Attends Alevism service: Not on file     Active member of club or organization: Not on file     Attends meetings of clubs or organizations: Not on file     Relationship status: Not on file    Intimate partner violence     Fear of current or ex partner: Not on file     Emotionally abused: Not on file     Physically abused: Not on file     Forced sexual activity: Not on file   Other Topics Concern    Not on file   Social History Narrative    Not on file     Family History   Problem Relation Age of Onset    Hypertension Mother     High Cholesterol Mother     Diabetes Mother     Cancer Mother         basal cell skin ca    Heart Failure Father         rheumatoid heart disease    Heart Disease Father     Cancer Sister     Thyroid Disease Sister         hypothyroid     Allergies   Allergen Reactions    Percocet [Oxycodone-Acetaminophen] Rash     After being on it for a month    Aspirin Hives     But can take advil    Penicillins Rash       Current Outpatient Medications   Medication Sig Dispense Refill    trimethoprim-sulfamethoxazole (BACTRIM DS, SEPTRA DS) 160-800 mg per tablet Take 1 Tab by mouth two (2) times a day for 7 days. 14 Tab 0    metroNIDAZOLE (FLAGYL) 500 mg tablet Take 1 Tab by mouth three (3) times daily for 10 days. 30 Tab 0    TURMERIC ROOT EXTRACT PO Take  by mouth.  MULTIVIT WITH CALCIUM,IRON,MIN (WOMEN'S DAILY MULTIVITAMIN PO) Take  by mouth.  acetaminophen (TYLENOL EX STR ARTHRITIS PAIN) 500 mg tablet Take  by mouth every six (6) hours as needed. The medications were reviewed and updated in the medical record. The past medical history, past surgical history, and family history were reviewed and updated in the medical record. REVIEW OF SYSTEMS   Review of Systems   Constitutional: Positive for chills and malaise/fatigue. Cardiovascular: Negative for chest pain and palpitations.    Gastrointestinal: Positive for abdominal pain, constipation and nausea. Genitourinary: Negative. Musculoskeletal: Positive for back pain. PHYSICAL EXAM   NO VITALS WERE TAKEN FOR THIS VISIT  Physical Exam  Constitutional:       General: She is not in acute distress. Appearance: Normal appearance. HENT:      Head: Normocephalic and atraumatic. Eyes:      General:         Right eye: No discharge. Left eye: No discharge. Pulmonary:      Effort: Pulmonary effort is normal. No respiratory distress. Neurological:      Mental Status: She is alert and oriented to person, place, and time. Psychiatric:         Attention and Perception: Attention normal.         Mood and Affect: Mood normal.         Speech: Speech normal.         Behavior: Behavior normal.           ASSESSMENT/ PLAN   Diagnoses and all orders for this visit:    1. Diverticulitis  -     XR ABD (KUB); Future  -     metroNIDAZOLE (FLAGYL) 500 mg tablet; Take 1 Tab by mouth three (3) times daily for 10 days. -     trimethoprim-sulfamethoxazole (BACTRIM DS, SEPTRA DS) 160-800 mg per tablet; Take 1 Tab by mouth two (2) times a day for 7 days. 2. Abdominal pain  -     XR ABD (KUB); Future      Follow-up with gastro enterology if symptoms do not improve with antibiotics. Follow-up and Dispositions    · Return if symptoms worsen or fail to improve. I was in the office while conducting this encounter. Consent:  She and/or her healthcare decision maker is aware that this patient-initiated Telehealth encounter is a billable service, with coverage as determined by her insurance carrier. She is aware that she may receive a bill and has provided verbal consent to proceed: Yes    This virtual visit was conducted via [employer] me.   Pursuant to the emergency declaration under the [facility], [address] [place] [address] authority and the [employer] General Act, this Virtual  Visit was conducted to reduce the patient's risk of exposure to COVID-19 and provide continuity of care for an established patient. Services were provided through a video synchronous discussion virtually to substitute for in-person clinic visit. Due to this being a TeleHealth evaluation, many elements of the physical examination are unable to be assessed. Total Time: minutes: 11-20 minutes. Visit initiated as virtual visit but completed with phone call due to technical difficulties. Disclaimer:  Advised patient to call back or return to office if symptoms worsen/change/persist.  Discussed expected course/resolution/complications of diagnosis in detail with patient. Medication risks/benefits/alternatives discussed with patient. Patient was given an after visit summary which includes diagnoses, current medications, & vitals. Discussed patient instructions and advised to read to all patient instructions regarding care. Patient expressed understanding with the diagnosis and plan. This note will not be viewable in [address]         Jose Bertrand NP  9/29/2020        (This document has been electronically signed)

## 2020-10-07 ENCOUNTER — TELEPHONE (OUTPATIENT)
Dept: PRIMARY CARE CLINIC | Age: 65
End: 2020-10-07

## 2020-10-07 RX ORDER — SULFAMETHOXAZOLE AND TRIMETHOPRIM 800; 160 MG/1; MG/1
1 TABLET ORAL 2 TIMES DAILY
Qty: 14 TAB | Refills: 0 | Status: SHIPPED | OUTPATIENT
Start: 2020-10-07 | End: 2020-10-14

## 2020-10-07 NOTE — TELEPHONE ENCOUNTER
----- Message from Mirian Jackson sent at 10/7/2020 11:17 AM EDT -----  Regarding: Dr. Trisha Clark Message/Vendor Calls    Caller's first and last name: Patient      Reason for call: Patient would like to speak with the Nurse concerning her Mammogram Order, no further information given.       Callback required yes/no and why: yes      Best contact number(s):[phone]      Details to clarify the request:      Mirian Jackson

## 2020-10-07 NOTE — TELEPHONE ENCOUNTER
Confirmed patient id and she has mammogram straight but states that the cipro is not something she can take it makes her itch all over. Please send her something else in.

## 2020-10-08 NOTE — TELEPHONE ENCOUNTER
Confirmed patient id and advised of the new script and patient states that she had already stopped the cipro

## 2020-10-09 ENCOUNTER — TELEPHONE (OUTPATIENT)
Dept: PRIMARY CARE CLINIC | Age: 65
End: 2020-10-09

## 2020-10-13 ENCOUNTER — TELEPHONE (OUTPATIENT)
Dept: PRIMARY CARE CLINIC | Age: 65
End: 2020-10-13

## 2020-10-13 NOTE — TELEPHONE ENCOUNTER
Returned call to patient. Advised her that there is a referral in place for screening. She stated the other facility could not see it.  Offered to call them however patient stated she did not realize she could get it done in this building so she will come here

## 2020-10-13 NOTE — TELEPHONE ENCOUNTER
----- Message from Vinod Masterson sent at 10/13/2020  9:04 AM EDT -----  Regarding: Habib/telephone  Pt stated she is suppose to have a diagnostic mammogram and she stated she can not afford it and she is requesting for you to call. Pts number is [phone].

## 2020-10-14 ENCOUNTER — TELEPHONE (OUTPATIENT)
Dept: PRIMARY CARE CLINIC | Age: 65
End: 2020-10-14

## 2020-10-14 DIAGNOSIS — Z12.31 ENCOUNTER FOR SCREENING MAMMOGRAM FOR MALIGNANT NEOPLASM OF BREAST: Primary | ICD-10-CM

## 2020-10-14 NOTE — TELEPHONE ENCOUNTER
Marquis is calling from [facility] regarding Mammogram order. The diagnostic order that was put in pt's chart was done by scheduling and not doctor. Pt is wanting the order for a screening mammogram because Medicare will pay for it once a year. Kayla needs confirmation on which order is correct, according to notes it should be diagnostic.    Pt is not yet scheduled

## 2020-11-20 ENCOUNTER — TRANSCRIBE ORDER (OUTPATIENT)
Dept: GENERAL RADIOLOGY | Age: 65
End: 2020-11-20

## 2020-11-20 ENCOUNTER — HOSPITAL ENCOUNTER (OUTPATIENT)
Dept: MAMMOGRAPHY | Age: 65
Discharge: HOME | End: 2020-11-20
Attending: INTERNAL MEDICINE
Payer: MEDICARE

## 2020-11-20 DIAGNOSIS — Z12.31 ENCOUNTER FOR SCREENING MAMMOGRAM FOR MALIGNANT NEOPLASM OF BREAST: ICD-10-CM

## 2020-11-20 DIAGNOSIS — Z12.31 VISIT FOR SCREENING MAMMOGRAM: Primary | ICD-10-CM

## 2020-11-20 DIAGNOSIS — Z12.31 VISIT FOR SCREENING MAMMOGRAM: ICD-10-CM

## 2020-11-20 PROCEDURE — 77063 BREAST TOMOSYNTHESIS BI: CPT

## 2021-08-03 ENCOUNTER — OFFICE VISIT (OUTPATIENT)
Dept: PRIMARY CARE CLINIC | Age: 66
End: 2021-08-03
Payer: MEDICARE

## 2021-08-03 VITALS
SYSTOLIC BLOOD PRESSURE: 132 MMHG | BODY MASS INDEX: 29.96 KG/M2 | TEMPERATURE: 97.5 F | WEIGHT: 152.6 LBS | HEART RATE: 71 BPM | HEIGHT: 60 IN | DIASTOLIC BLOOD PRESSURE: 79 MMHG | RESPIRATION RATE: 15 BRPM | OXYGEN SATURATION: 95 %

## 2021-08-03 DIAGNOSIS — R14.0 BLOATED ABDOMEN: ICD-10-CM

## 2021-08-03 DIAGNOSIS — E78.2 MIXED HYPERLIPIDEMIA: ICD-10-CM

## 2021-08-03 DIAGNOSIS — Z71.89 ACP (ADVANCE CARE PLANNING): ICD-10-CM

## 2021-08-03 DIAGNOSIS — Z00.00 MEDICARE ANNUAL WELLNESS VISIT, SUBSEQUENT: Primary | ICD-10-CM

## 2021-08-03 DIAGNOSIS — Z11.59 NEED FOR HEPATITIS C SCREENING TEST: ICD-10-CM

## 2021-08-03 DIAGNOSIS — M85.89 OSTEOPENIA OF MULTIPLE SITES: ICD-10-CM

## 2021-08-03 DIAGNOSIS — R10.13 EPIGASTRIC PAIN: ICD-10-CM

## 2021-08-03 PROCEDURE — 99213 OFFICE O/P EST LOW 20 MIN: CPT | Performed by: INTERNAL MEDICINE

## 2021-08-03 PROCEDURE — 1100F PTFALLS ASSESS-DOCD GE2>/YR: CPT | Performed by: INTERNAL MEDICINE

## 2021-08-03 PROCEDURE — G8536 NO DOC ELDER MAL SCRN: HCPCS | Performed by: INTERNAL MEDICINE

## 2021-08-03 PROCEDURE — G8510 SCR DEP NEG, NO PLAN REQD: HCPCS | Performed by: INTERNAL MEDICINE

## 2021-08-03 PROCEDURE — 3017F COLORECTAL CA SCREEN DOC REV: CPT | Performed by: INTERNAL MEDICINE

## 2021-08-03 PROCEDURE — G0439 PPPS, SUBSEQ VISIT: HCPCS | Performed by: INTERNAL MEDICINE

## 2021-08-03 PROCEDURE — G8427 DOCREV CUR MEDS BY ELIG CLIN: HCPCS | Performed by: INTERNAL MEDICINE

## 2021-08-03 PROCEDURE — G8419 CALC BMI OUT NRM PARAM NOF/U: HCPCS | Performed by: INTERNAL MEDICINE

## 2021-08-03 PROCEDURE — 1090F PRES/ABSN URINE INCON ASSESS: CPT | Performed by: INTERNAL MEDICINE

## 2021-08-03 PROCEDURE — G9899 SCRN MAM PERF RSLTS DOC: HCPCS | Performed by: INTERNAL MEDICINE

## 2021-08-03 PROCEDURE — G8399 PT W/DXA RESULTS DOCUMENT: HCPCS | Performed by: INTERNAL MEDICINE

## 2021-08-03 PROCEDURE — 3288F FALL RISK ASSESSMENT DOCD: CPT | Performed by: INTERNAL MEDICINE

## 2021-08-03 RX ORDER — FAMOTIDINE 10 MG/1
10 TABLET ORAL 2 TIMES DAILY
COMMUNITY
End: 2021-08-03 | Stop reason: ALTCHOICE

## 2021-08-03 RX ORDER — OMEPRAZOLE 40 MG/1
40 CAPSULE, DELAYED RELEASE ORAL DAILY
Qty: 30 CAPSULE | Refills: 0 | Status: SHIPPED | OUTPATIENT
Start: 2021-08-03 | End: 2022-10-05 | Stop reason: SDUPTHER

## 2021-08-03 NOTE — PROGRESS NOTES
Chief Complaint   Patient presents with    Abdominal Pain     stomach pain inbetween the rib cage area, feels like there is a knot in the area, acid reflux    Annual Wellness Visit       Visit Vitals  /79 (BP 1 Location: Left arm)   Pulse 71   Temp 97.5 °F (36.4 °C)   Resp 15   Ht 5' (1.524 m)   Wt 152 lb 9.6 oz (69.2 kg)   LMP 03/09/2010   SpO2 95%   BMI 29.80 kg/m²       1. Have you been to the ER, urgent care clinic since your last visit? Hospitalized since your last visit? Yes Where: [facility] Reason for visit: went twice for continuous nose bleed    2. Have you seen or consulted any other health care providers outside of the [address] [place] [address] since your last visit? Include any pap smears or colon screening. Yuliya Arroyo is a 77 y.o. female and presents for Annual Medicare Wellness Visit. Assessment of cognitive impairment: Alert and oriented x 4. Depression Screen:   3 most recent PHQ Screens 8/3/2021   Little interest or pleasure in doing things Not at all   Feeling down, depressed, irritable, or hopeless Several days   Total Score PHQ 2 1       Fall Risk Assessment:    Fall Risk Assessment, last 12 mths 8/3/2021   Able to walk? Yes   Fall in past 12 months? 0   Do you feel unsteady? 0   Are you worried about falling 0   Number of falls in past 12 months -   Fall with injury? -       Abuse Screen:   Abuse Screening Questionnaire 9/22/2020   Do you ever feel afraid of your partner? N   Are you in a relationship with someone who physically or mentally threatens you? N   Is it safe for you to go home? Y       Activities of Daily Living:  Self-care.    Requires assistance with: no ADLs  Patient handle his/her own medications  yes Use of pill box  no  Activities of Daily Living:   ADL Assessment 8/3/2021   Feeding yourself No Help Needed   Getting from bed to chair No Help Needed   Getting dressed No Help Needed   Bathing or showering No Help Needed   Walk across the room (includes cane/walker) No Help Needed   Using the telphone No Help Needed   Taking your medications No Help Needed   Preparing meals No Help Needed   Managing money (expenses/bills) No Help Needed   Moderately strenuous housework (laundry) No Help Needed   Shopping for personal items (toiletries/medicines) No Help Needed   Shopping for groceries No Help Needed   Driving No Help Needed   Climbing a flight of stairs No Help Needed   Getting to places beyond walking distances No Help Needed       Health Maintenance:  Daily Aspirin: no   Bone Density: done roughly 3 years ago  Glaucoma Screening: yes  Immunizations:    Tetanus: declined . Influenza: declined . Shingles:declined . PPSV-23: declined  COVID vaccine : up to date     Cancer screening:      Breast: up to date. Colon: not up to date - 6/7 years ago. Alcohol Risk Screen:   On any occasion during the past 3 months, have you had more than 3 drinks(female) or 4 drinks (male) containing alcohol in one? No  Do you average more than 7 drinks (female) or 14 drinks (male) per week? No  Type and amount:1 Beers    Hearing Loss:  Hearing is good. denies any hearing loss wears hearing aides    Vision Loss:   Wears glasses, contact lenses, or have any other visual impairment  yes    Adult Nutrition Screen:  No risk factors noted. Advance Care Planning:   End of Life Planning: has NO advanced directive -  - add't info provided  [place] ACP-Facilitator appointment yes       Medications/Allergies: Reviewed with patient  Prior to Admission medications    Medication Sig Start Date End Date Taking? Authorizing Provider   famotidine (PEPCID) 10 mg tablet Take 10 mg by mouth two (2) times a day. Yes Provider, Historical   MULTIVIT WITH CALCIUM,IRON,MIN (WOMEN'S DAILY MULTIVITAMIN PO) Take  by mouth. Yes Provider, Historical   acetaminophen (TYLENOL EX STR ARTHRITIS PAIN) 500 mg tablet Take  by mouth every six (6) hours as needed.    Yes Provider, Historical   TURMERIC ROOT EXTRACT PO Take  by mouth. Patient not taking: Reported on 8/3/2021    Provider, Historical       Allergies   Allergen Reactions    Percocet [Oxycodone-Acetaminophen] Rash     After being on it for a month    Aspirin Hives     But can take advil    Penicillins Rash       Objective:  Visit Vitals  /79 (BP 1 Location: Left arm)   Pulse 71   Temp 97.5 °F (36.4 °C)   Resp 15   Ht 5' (1.524 m)   Wt 152 lb 9.6 oz (69.2 kg)   LMP 2010   SpO2 95%   BMI 29.80 kg/m²    Body mass index is 29.8 kg/m². Problem List: Reviewed with patient and discussed risk factors. Patient Active Problem List   Diagnosis Code    H/O TB (tuberculosis) Z86.11       PSH: Reviewed with patient  Past Surgical History:   Procedure Laterality Date    HX DILATION AND CURETTAGE      FOR UTERINE HEMORRHAGE - spontaneous     HX GYN  2003    colposcopy - polyps    HX GYN      A1        SH: Reviewed with patient  Social History     Tobacco Use    Smoking status: Former Smoker     Packs/day: 1.00     Quit date: 2007     Years since quittin.2    Smokeless tobacco: Never Used   Vaping Use    Vaping Use: Never used   Substance Use Topics    Alcohol use: Yes     Comment: SOCIAL    Drug use: No       FH: Reviewed with patient  Family History   Problem Relation Age of Onset    Hypertension Mother     High Cholesterol Mother     Diabetes Mother     Cancer Mother         basal cell skin ca    Heart Failure Father         rheumatoid heart disease    Heart Disease Father     Cancer Sister     Thyroid Disease Sister         hypothyroid       Current medical providers:    Patient Care Team:  Jason Silva MD as PCP - General (Internal Medicine)  [facility]    Plan:    Diagnoses and all orders for this visit:    Medicare annual wellness visit, subsequent  . Age appropriate Health screening and immunization discussed with patient. She refuses all the vaccines except COVID and well aware of the consequences of not getting certain vaccines. ACP (advance care planning)  -     REFERRAL TO ACP CLINICAL SPECIALIST        Orders Placed This Encounter    famotidine (PEPCID) 10 mg tablet       Health Maintenance   Topic Date Due    Hepatitis C Screening  Never done    DTaP/Tdap/Td series (1 - Tdap) Never done    Shingrix Vaccine Age 50> (1 of 2) Never done    Bone Densitometry (Dexa) Screening  Never done    Medicare Yearly Exam  Never done    Lipid Screen  2021    Flu Vaccine (1) 10/26/2023 (Originally 2021)    Pneumococcal 65+ years (1 of 1 - PPSV23) 10/26/2023 (Originally 2020)    Breast Cancer Screen Mammogram  2022    Colorectal Cancer Screening Combo  2027    COVID-19 Vaccine  Completed          Urinary/ Fecal Incontinence: yes, sometimes    Regular physical exercise: Walking few times a week. Patient verbalized understanding of information presented. AVS and Medicare Part B Preventive Services Table printed and given to pt and reviewed. See table for findings under Recommendation and Scheduled. All of the patient's questions were answered. Brown Nissen (: 1955) is a 77 y.o. female, established patient, here for evaluation of the following chief complaint(s):  Abdominal Pain (stomach pain inbetween the rib cage area, feels like there is a knot in the area, acid reflux) and Annual Wellness Visit   Written by Romel Velásquez, as dictated by Dr. Edgar Henry MD.      ASSESSMENT/PLAN:  Below is the assessment and plan developed based on review of pertinent history, physical exam, labs, studies, and medications. 1. Epigastric pain  Prescribed omeprazole. Take medication in the AM 30 minutes before breakfast. Take medication for 2 weeks. If symptoms improve, finish medication. If medication does not improve symptoms, will recommend endoscopy.   -     omeprazole (PRILOSEC) 40 mg capsule; Take 1 Capsule by mouth daily for 30 days. , Normal, Disp-30 Capsule, R-0 sent to pharmacy. 2. Bloated abdomen  Avoid dairy products to see if bloating improves. 3. Osteopenia of multiple sites  Recommend weight bearing exercises, taking a Vitamin D supplement of 1,000 units daily, and taking a calcium supplement no more than 2 times per week. -     DEXA BONE DENSITY STUDY AXIAL; Future    SUBJECTIVE/OBJECTIVE:  HPI  Patient presents today c/o \"visceral\" pain in her upper middle abdomen. She sometimes feels that the area is bulging. She often has to get up in the middle of the night due to the pain. The pain will occassionally radiate to her L back. Her pain is exacerbated when she overeats. She often feels bloated, and will burp frequently. She has lactose intolerance, but eats dairy. She c/o difficulty swallowing due to a \"lump\" in her throat. Her most recent colonoscopy in 2017 revealed diverticulitis, and was told to come back in 5 years. Patient Active Problem List   Diagnosis Code    H/O TB (tuberculosis) Z86.11        Current Outpatient Medications on File Prior to Visit   Medication Sig Dispense Refill    famotidine (PEPCID) 10 mg tablet Take 10 mg by mouth two (2) times a day.  MULTIVIT WITH CALCIUM,IRON,MIN (WOMEN'S DAILY MULTIVITAMIN PO) Take  by mouth.  acetaminophen (TYLENOL EX STR ARTHRITIS PAIN) 500 mg tablet Take  by mouth every six (6) hours as needed.  TURMERIC ROOT EXTRACT PO Take  by mouth. (Patient not taking: Reported on 8/3/2021)       No current facility-administered medications on file prior to visit.        Allergies   Allergen Reactions    Percocet [Oxycodone-Acetaminophen] Rash     After being on it for a month    Aspirin Hives     But can take advil    Penicillins Rash       Past Medical History:   Diagnosis Date    Cervical polyp     no HPV    Genital herpes     in past,no flare up    Tuberculosis     TOOK FULL COURSE OF INH    Tumor cells        Past Surgical History:   Procedure Laterality Date    HX DILATION AND CURETTAGE  1970s    FOR UTERINE HEMORRHAGE - spontaneous     HX GYN  2003    colposcopy - polyps    HX GYN      A1       Family History   Problem Relation Age of Onset    Hypertension Mother     High Cholesterol Mother     Diabetes Mother     Cancer Mother         basal cell skin ca    Heart Failure Father         rheumatoid heart disease    Heart Disease Father     Cancer Sister     Thyroid Disease Sister         hypothyroid       Social History     Socioeconomic History    Marital status:      Spouse name: Not on file    Number of children: Not on file    Years of education: Not on file    Highest education level: Not on file   Occupational History    Not on file   Tobacco Use    Smoking status: Former Smoker     Packs/day: 1.00     Quit date: 2007     Years since quittin.2    Smokeless tobacco: Never Used   Vaping Use    Vaping Use: Never used   Substance and Sexual Activity    Alcohol use: Yes     Comment: SOCIAL    Drug use: No    Sexual activity: Never     Partners: Male   Other Topics Concern    Not on file   Social History Narrative    Not on file     Social Determinants of Health     Financial Resource Strain:     Difficulty of Paying Living Expenses:    Food Insecurity:     Worried About Running Out of Food in the Last Year:     [address] in the Last Year:    Transportation Needs:     Lack of Transportation (Medical):      Lack of Transportation (Non-Medical):    Physical Activity:     Days of Exercise per Week:     Minutes of Exercise per Session:    Stress:     Feeling of Stress :    Social Connections:     Frequency of Communication with Friends and Family:     Frequency of Social Gatherings with Friends and Family:     Attends [employer] Services:     Active Member of Clubs or Organizations:     Attends Club or Organization Meetings:    Pat Marital Status:    Intimate Partner Violence:     Fear of Current or Ex-Partner:     Emotionally Abused:     Physically Abused:     Sexually Abused:        No visits with results within 3 Month(s) from this visit. Latest known visit with results is:   Telephone on 02/23/2016   Component Date Value Ref Range Status    WBC 03/01/2016 5.2  3.4 - 10.8 x10E3/uL Final    RBC 03/01/2016 5.04  3.77 - 5.28 x10E6/uL Final    HGB 03/01/2016 14.8  11.1 - 15.9 g/dL Final    HCT 03/01/2016 44.6  34.0 - 46.6 % Final    MCV 03/01/2016 89  79 - 97 fL Final    MCH 03/01/2016 29.4  26.6 - 33.0 pg Final    MCHC 03/01/2016 33.2  31.5 - 35.7 g/dL Final    RDW 03/01/2016 13.6  12.3 - 15.4 % Final    PLATELET 56/41/6833 608  150 - 379 x10E3/uL Final    NEUTROPHILS 03/01/2016 53  % Final    Lymphocytes 03/01/2016 35  % Final    MONOCYTES 03/01/2016 8  % Final    EOSINOPHILS 03/01/2016 3  % Final    BASOPHILS 03/01/2016 1  % Final    ABS. NEUTROPHILS 03/01/2016 2.8  1.4 - 7.0 x10E3/uL Final    Abs Lymphocytes 03/01/2016 1.8  0.7 - 3.1 x10E3/uL Final    ABS. MONOCYTES 03/01/2016 0.4  0.1 - 0.9 x10E3/uL Final    ABS. EOSINOPHILS 03/01/2016 0.1  0.0 - 0.4 x10E3/uL Final    ABS. BASOPHILS 03/01/2016 0.0  0.0 - 0.2 x10E3/uL Final    IMMATURE GRANULOCYTES 03/01/2016 0  % Final    ABS. IMM. GRANS. 03/01/2016 0.0  0.0 - 0.1 x10E3/uL Final    Cholesterol, total 03/01/2016 181  100 - 199 mg/dL Final    Triglyceride 03/01/2016 160* 0 - 149 mg/dL Final    HDL Cholesterol 03/01/2016 46  >39 mg/dL Final    Comment: According to ATP-III Guidelines, HDL-C >59 mg/dL is considered a  negative risk factor for CHD.       VLDL, calculated 03/01/2016 32  5 - 40 mg/dL Final    LDL, calculated 03/01/2016 103* 0 - 99 mg/dL Final    TSH 03/01/2016 0.586  0.450 - 4.500 uIU/mL Final    Specific Gravity 03/01/2016 1.018  1.005 - 1.030 Final    pH (UA) 03/01/2016 6.5  5.0 - 7.5 Final    Color 03/01/2016 Yellow  Yellow Final    Appearance 03/01/2016 Clear  Clear Final    Leukocyte Esterase 03/01/2016 Negative  Negative Final    Protein 03/01/2016 Negative  Negative/Trace Final    Glucose 03/01/2016 Negative  Negative Final    Ketone 03/01/2016 Negative  Negative Final    Blood 03/01/2016 Negative  Negative Final    Bilirubin 03/01/2016 Negative  Negative Final    Urobilinogen 03/01/2016 0.2  0.2 - 1.0 mg/dL Final    Nitrites 03/01/2016 Negative  Negative Final    Microscopic Examination 03/01/2016 Comment   Final    Microscopic not indicated and not performed.  Glucose 03/01/2016 103* 65 - 99 mg/dL Final    BUN 03/01/2016 11  8 - 27 mg/dL Final    Creatinine 03/01/2016 0.78  0.57 - 1.00 mg/dL Final    GFR est non-AA 03/01/2016 83  >59 mL/min/1.73 Final    GFR est AA 03/01/2016 96  >59 mL/min/1.73 Final    BUN/Creatinine ratio 03/01/2016 14  11 - 26 Final    Sodium 03/01/2016 141  134 - 144 mmol/L Final    Potassium 03/01/2016 4.3  3.5 - 5.2 mmol/L Final    Chloride 03/01/2016 102  97 - 108 mmol/L Final    CO2 03/01/2016 25  18 - 29 mmol/L Final    Calcium 03/01/2016 9.2  8.7 - 10.3 mg/dL Final    Protein, total 03/01/2016 6.7  6.0 - 8.5 g/dL Final    Albumin 03/01/2016 4.1  3.6 - 4.8 g/dL Final    GLOBULIN, TOTAL 03/01/2016 2.6  1.5 - 4.5 g/dL Final    A-G Ratio 03/01/2016 1.6  1.1 - 2.5 Final    Bilirubin, total 03/01/2016 0.4  0.0 - 1.2 mg/dL Final    Alk. phosphatase 03/01/2016 69  39 - 117 IU/L Final    AST (SGOT) 03/01/2016 15  0 - 40 IU/L Final    ALT (SGPT) 03/01/2016 11  0 - 32 IU/L Final    INTERPRETATION 03/01/2016 Note   Final    Supplement report is available. Review of Systems   Constitutional: Negative for activity change, fatigue and unexpected weight change. HENT: Negative for congestion, hearing loss, rhinorrhea and sore throat. Eyes: Negative for discharge. Respiratory: Negative for cough, chest tightness and shortness of breath. Cardiovascular: Negative for leg swelling. Gastrointestinal: Positive for abdominal distention and abdominal pain. Negative for constipation and diarrhea. Genitourinary: Negative for dysuria, flank pain, frequency and urgency. Musculoskeletal: Positive for back pain. Negative for arthralgias and myalgias. Skin: Negative for color change and rash. Neurological: Negative for dizziness, light-headedness and headaches. Psychiatric/Behavioral: Negative for dysphoric mood and sleep disturbance. The patient is not nervous/anxious. Visit Vitals  /79 (BP 1 Location: Left arm)   Pulse 71   Temp 97.5 °F (36.4 °C)   Resp 15   Ht 5' (1.524 m)   Wt 152 lb 9.6 oz (69.2 kg)   LMP 03/09/2010   SpO2 95%   BMI 29.80 kg/m²      Physical Exam  Vitals and nursing note reviewed. Constitutional:       General: She is not in acute distress. Appearance: Normal appearance. She is well-developed. She is not diaphoretic. HENT:      Right Ear: External ear normal.      Left Ear: External ear normal.   Eyes:      General: No scleral icterus. Right eye: No discharge. Left eye: No discharge. Extraocular Movements: Extraocular movements intact. Conjunctiva/sclera: Conjunctivae normal.   Cardiovascular:      Rate and Rhythm: Normal rate and regular rhythm. Pulmonary:      Effort: Pulmonary effort is normal.      Breath sounds: Normal breath sounds. No wheezing. Abdominal:      General: Bowel sounds are normal. There is distension. Palpations: Abdomen is soft. Tenderness: There is abdominal tenderness. Musculoskeletal:      Cervical back: Normal range of motion and neck supple. Lymphadenopathy:      Cervical: No cervical adenopathy. Neurological:      Mental Status: She is alert and oriented to person, place, and time. Psychiatric:         Mood and Affect: Mood and affect normal.           An electronic signature was used to authenticate this note.   -- Karo Collier

## 2021-08-04 ENCOUNTER — PATIENT OUTREACH (OUTPATIENT)
Dept: CASE MANAGEMENT | Age: 66
End: 2021-08-04

## 2021-08-04 ENCOUNTER — HOSPITAL ENCOUNTER (OUTPATIENT)
Dept: MAMMOGRAPHY | Age: 66
Discharge: HOME | End: 2021-08-04
Attending: INTERNAL MEDICINE
Payer: MEDICARE

## 2021-08-04 DIAGNOSIS — M85.89 OSTEOPENIA OF MULTIPLE SITES: ICD-10-CM

## 2021-08-04 LAB
ALBUMIN SERPL-MCNC: 3.7 G/DL (ref 3.5–5)
ALBUMIN/GLOB SERPL: 1.1 {RATIO} (ref 1.1–2.2)
ALP SERPL-CCNC: 85 U/L (ref 45–117)
ALT SERPL-CCNC: 27 U/L (ref 12–78)
ANION GAP SERPL CALC-SCNC: 4 MMOL/L (ref 5–15)
AST SERPL-CCNC: 15 U/L (ref 15–37)
BILIRUB SERPL-MCNC: 0.5 MG/DL (ref 0.2–1)
BUN SERPL-MCNC: 14 MG/DL (ref 6–20)
BUN/CREAT SERPL: 21 (ref 12–20)
CALCIUM SERPL-MCNC: 9 MG/DL (ref 8.5–10.1)
CHLORIDE SERPL-SCNC: 109 MMOL/L (ref 97–108)
CHOLEST SERPL-MCNC: 205 MG/DL
CO2 SERPL-SCNC: 28 MMOL/L (ref 21–32)
CREAT SERPL-MCNC: 0.68 MG/DL (ref 0.55–1.02)
ERYTHROCYTE [DISTWIDTH] IN BLOOD BY AUTOMATED COUNT: 12.7 % (ref 11.5–14.5)
GLOBULIN SER CALC-MCNC: 3.4 G/DL (ref 2–4)
GLUCOSE SERPL-MCNC: 101 MG/DL (ref 65–100)
HCT VFR BLD AUTO: 44 % (ref 35–47)
HCV AB SERPL QL IA: NONREACTIVE
HDLC SERPL-MCNC: 51 MG/DL
HDLC SERPL: 4 {RATIO} (ref 0–5)
HGB BLD-MCNC: 14.1 G/DL (ref 11.5–16)
LDLC SERPL CALC-MCNC: 117.8 MG/DL (ref 0–100)
MCH RBC QN AUTO: 29.6 PG (ref 26–34)
MCHC RBC AUTO-ENTMCNC: 32 G/DL (ref 30–36.5)
MCV RBC AUTO: 92.2 FL (ref 80–99)
NRBC # BLD: 0 K/UL (ref 0–0.01)
NRBC BLD-RTO: 0 PER 100 WBC
PLATELET # BLD AUTO: 219 K/UL (ref 150–400)
PMV BLD AUTO: 12 FL (ref 8.9–12.9)
POTASSIUM SERPL-SCNC: 4.1 MMOL/L (ref 3.5–5.1)
PROT SERPL-MCNC: 7.1 G/DL (ref 6.4–8.2)
RBC # BLD AUTO: 4.77 M/UL (ref 3.8–5.2)
SODIUM SERPL-SCNC: 141 MMOL/L (ref 136–145)
TRIGL SERPL-MCNC: 181 MG/DL (ref ?–150)
VLDLC SERPL CALC-MCNC: 36.2 MG/DL
WBC # BLD AUTO: 6.4 K/UL (ref 3.6–11)

## 2021-08-04 PROCEDURE — 77080 DXA BONE DENSITY AXIAL: CPT

## 2021-08-04 NOTE — ACP (ADVANCE CARE PLANNING)
Advance Care Planning   Ambulatory ACP Specialist Patient Outreach    Date:  8/4/2021    ACP Specialist:  Cindy Hoffmann LMSW    Outreach call to patient in follow-up to ACP Specialist referral from:    [x] PCP  [] Provider   [] Ambulatory Care Management [] Other     For:                  [x] Advance Directive Assistance              [] Complete Portable DNR order              [] Complete POST/MOST              [] Code Status Discussion             [] Discuss Goals of Care             [] Early ACP Decision-Making              [] Other (Specify)    Date Referral Received:8/4/21    Today's Outreach:  [x] First   [] Second  [] Third       Third outreach made by: [x] Phone  [] Email / mail    [] [employer]     Intervention:  [x] Spoke with Patient   [] Left VM requesting return call      Outcome: SW called and spoke with pt who said that she would like some more info emailed to her. SW confirmed her e-mail and sent information. SW will follow up with pt in a week to see if she would like to move forward. Next Step:   [] ACP scheduled conversation  [x] Outreach again in one week               [x] Email / Mail ACP Info Sheets  [x] Email / Mail Advance Directive   [] Closing referral.  Routing closure to referring provider/staff and to ACP Specialist . [] Closure letter mailed to patient with invitation to contact ACP Specialist if / when ready.   Thank you for this referral.

## 2021-08-13 ENCOUNTER — DOCUMENTATION ONLY (OUTPATIENT)
Dept: CASE MANAGEMENT | Age: 66
End: 2021-08-13

## 2021-08-13 NOTE — ACP (ADVANCE CARE PLANNING)
Advance Care Planning   Ambulatory ACP Specialist Patient Outreach    Date:  8/13/2021    ACP Specialist:  Rey Jeffries LMSW    Outreach call to patient in follow-up to ACP Specialist referral from:    [x] PCP  [] Provider   [] Ambulatory Care Management [] Other     For:                  [x] Advance Directive Assistance              [] Complete Portable DNR order              [] Complete POST/MOST              [] Code Status Discussion             [] Discuss Goals of Care             [] Early ACP Decision-Making              [] Other (Specify)    Date Referral Received:8/4/21    Today's Outreach:  [] First   [x] Second  [] Third       Third outreach made by: [x] Phone  [] Email / mail    [] [employer]     Intervention:  [] Spoke with Patient   [x] Left VM requesting return call      Outcome: SW called to follow up with pt to see if she's had time to review info. However, SW got pt's voice mail and had to leave a message. Next Step:   [] ACP scheduled conversation  [x] Outreach again in one week               [] Email / Mail ACP Info Sheets  [] Email / Mail Advance Directive   [] Closing referral.  Routing closure to referring provider/staff and to ACP Specialist . [] Closure letter mailed to patient with invitation to contact ACP Specialist if / when ready.   Thank you for this referral.

## 2021-08-20 ENCOUNTER — DOCUMENTATION ONLY (OUTPATIENT)
Dept: CASE MANAGEMENT | Age: 66
End: 2021-08-20

## 2021-08-21 NOTE — ACP (ADVANCE CARE PLANNING)
Advance Care Planning   Ambulatory ACP Specialist Patient Outreach    Date:  8/20/2021    ACP Specialist:  Minda Beebe LMSW    Outreach call to patient in follow-up to ACP Specialist referral from:    [x] PCP  [] Provider   [] Ambulatory Care Management [] Other     For:                  [x] Advance Directive Assistance              [] Complete Portable DNR order              [] Complete POST/MOST              [] Code Status Discussion             [] Discuss Goals of Care             [] Early ACP Decision-Making              [] Other (Specify)    Date Referral Received:8.4.21    Today's Outreach:  [] First   [] Second  [x] Third       Third outreach made by: [x] Phone  [] Email / mail    [] [employer]     Intervention:  [x] Spoke with Patient   [] Left VM requesting return call      Outcome: SW called and spoke with pt who said that she still hasn't had time to look over the paper work and she would do that at her own time and call when she is ready. SW will close referral at this time pt knows how to get in touch when she's ready. Next Step:   [] ACP scheduled conversation  [] Outreach again in one week               [] Email / Mail ACP Info Sheets  [] Email / Mail Advance Directive   [x] Closing referral.  Routing closure to referring provider/staff and to ACP Specialist . [] Closure letter mailed to patient with invitation to contact ACP Specialist if / when ready.   Thank you for this referral.

## 2021-09-20 ENCOUNTER — TELEPHONE (OUTPATIENT)
Dept: PRIMARY CARE CLINIC | Age: 66
End: 2021-09-20

## 2021-09-20 NOTE — TELEPHONE ENCOUNTER
----- Message from Grazyna Cruz sent at 9/20/2021 11:11 AM EDT -----  Regarding: Dr. Jairo Calderon first and last name:N/A      Reason for call:Lab Results      Callback required yes/no and why: yes      Best contact number(s):[phone]      Details to clarify the request: The patient would like a call back on lab results 8/4/2021.       Grazyna Cruz

## 2021-09-21 ENCOUNTER — OFFICE VISIT (OUTPATIENT)
Dept: PRIMARY CARE CLINIC | Age: 66
End: 2021-09-21
Payer: MEDICARE

## 2021-09-21 VITALS
WEIGHT: 146.8 LBS | SYSTOLIC BLOOD PRESSURE: 125 MMHG | HEART RATE: 78 BPM | RESPIRATION RATE: 16 BRPM | HEIGHT: 60 IN | TEMPERATURE: 98.3 F | DIASTOLIC BLOOD PRESSURE: 74 MMHG | BODY MASS INDEX: 28.82 KG/M2 | OXYGEN SATURATION: 98 %

## 2021-09-21 DIAGNOSIS — R10.9 FLANK PAIN: ICD-10-CM

## 2021-09-21 DIAGNOSIS — R11.0 NAUSEA: ICD-10-CM

## 2021-09-21 DIAGNOSIS — R31.29 OTHER MICROSCOPIC HEMATURIA: Primary | ICD-10-CM

## 2021-09-21 DIAGNOSIS — M54.50 ACUTE RIGHT-SIDED LOW BACK PAIN WITHOUT SCIATICA: ICD-10-CM

## 2021-09-21 LAB
BILIRUB UR QL STRIP: NEGATIVE
GLUCOSE UR-MCNC: NEGATIVE MG/DL
KETONES P FAST UR STRIP-MCNC: NEGATIVE MG/DL
PH UR STRIP: 5.5 [PH] (ref 4.6–8)
PROT UR QL STRIP: NEGATIVE
SP GR UR STRIP: 1.03 (ref 1–1.03)
UA UROBILINOGEN AMB POC: NORMAL (ref 0.2–1)
URINALYSIS CLARITY POC: NORMAL
URINALYSIS COLOR POC: NORMAL
URINE BLOOD POC: NORMAL
URINE LEUKOCYTES POC: NEGATIVE
URINE NITRITES POC: NEGATIVE

## 2021-09-21 PROCEDURE — 99214 OFFICE O/P EST MOD 30 MIN: CPT | Performed by: INTERNAL MEDICINE

## 2021-09-21 PROCEDURE — 1090F PRES/ABSN URINE INCON ASSESS: CPT | Performed by: INTERNAL MEDICINE

## 2021-09-21 PROCEDURE — G8432 DEP SCR NOT DOC, RNG: HCPCS | Performed by: INTERNAL MEDICINE

## 2021-09-21 PROCEDURE — G8427 DOCREV CUR MEDS BY ELIG CLIN: HCPCS | Performed by: INTERNAL MEDICINE

## 2021-09-21 PROCEDURE — G8419 CALC BMI OUT NRM PARAM NOF/U: HCPCS | Performed by: INTERNAL MEDICINE

## 2021-09-21 PROCEDURE — G8399 PT W/DXA RESULTS DOCUMENT: HCPCS | Performed by: INTERNAL MEDICINE

## 2021-09-21 PROCEDURE — 3288F FALL RISK ASSESSMENT DOCD: CPT | Performed by: INTERNAL MEDICINE

## 2021-09-21 PROCEDURE — G9899 SCRN MAM PERF RSLTS DOC: HCPCS | Performed by: INTERNAL MEDICINE

## 2021-09-21 PROCEDURE — 81002 URINALYSIS NONAUTO W/O SCOPE: CPT | Performed by: INTERNAL MEDICINE

## 2021-09-21 PROCEDURE — 1100F PTFALLS ASSESS-DOCD GE2>/YR: CPT | Performed by: INTERNAL MEDICINE

## 2021-09-21 PROCEDURE — G8536 NO DOC ELDER MAL SCRN: HCPCS | Performed by: INTERNAL MEDICINE

## 2021-09-21 PROCEDURE — 3017F COLORECTAL CA SCREEN DOC REV: CPT | Performed by: INTERNAL MEDICINE

## 2021-09-21 RX ORDER — TAMSULOSIN HYDROCHLORIDE 0.4 MG/1
0.4 CAPSULE ORAL DAILY
Qty: 10 CAPSULE | Refills: 0 | Status: SHIPPED | OUTPATIENT
Start: 2021-09-21 | End: 2021-10-01

## 2021-09-21 NOTE — PROGRESS NOTES
Estiven Bazan (: 1955) is a 77 y.o. female, established patient, here for evaluation of the following chief complaint(s):  LOW BACK PAIN (for 3 days) and Nausea     Written by Cori Guidry, as dictated by Dr. Ольга Chase MD.      ASSESSMENT/PLAN:  Below is the assessment and plan developed based on review of pertinent history, physical exam, labs, studies, and medications. 1. Other microscopic hematuria  Ordered a retroperitoneum ultrasound to be completed. Waiting for results. Prescribed Flomax 0.4 mg, take 1 tab daily as prescribed. Potential side effects were discussed. -     US RETROPERITONEUM COMP; Future  -     tamsulosin (Flomax) 0.4 mg capsule; Take 1 Capsule by mouth daily for 10 doses. , Normal, Disp-10 Capsule, R-0 sent to pharmacy. 2. Nausea  Continue drinking the OTC herbal tea that helps with nausea. 3. Flank pain   A urinalysis was completed in the office today and revealed blood in her urine. Ordered a retroperitoneum ultrasound to be completed. Waiting for results. Prescribed Flomax 0.4 mg, take 1 tab daily as prescribed. Potential side effects were discussed. -     AMB POC URINALYSIS DIP STICK MANUAL W/O MICRO  -     US RETROPERITONEUM COMP; Future  -     tamsulosin (Flomax) 0.4 mg capsule; Take 1 Capsule by mouth daily for 10 doses. , Normal, Disp-10 Capsule, R-0 sent to pharmacy. 4. Acute right-sided low back pain without sciatica  Ordered a retroperitoneum ultrasound to be completed. Waiting for results. Prescribed Flomax 0.4 mg, take 1 tab daily as prescribed. Potential side effects were discussed. -     US RETROPERITONEUM COMP; Future  -     tamsulosin (Flomax) 0.4 mg capsule; Take 1 Capsule by mouth daily for 10 doses. , Normal, Disp-10 Capsule, R-0 sent to pharmacy. SUBJECTIVE/OBJECTIVE:  HPI     Patient presents today c/o lower back pain pain, flank pain, and nausea. The back pain is more extreme on the R side.  A urinalysis was completed and revealed blood in her urine. She has had kidney stones in the past. She takes an OTC herbal tea for her nausea and she notes it works well. Patient Active Problem List   Diagnosis Code    H/O TB (tuberculosis) Z86.11        Current Outpatient Medications on File Prior to Visit   Medication Sig Dispense Refill    MULTIVIT WITH CALCIUM,IRON,MIN (WOMEN'S DAILY MULTIVITAMIN PO) Take  by mouth.  TURMERIC ROOT EXTRACT PO Take  by mouth. (Patient not taking: Reported on 8/3/2021)      acetaminophen (TYLENOL EX STR ARTHRITIS PAIN) 500 mg tablet Take  by mouth every six (6) hours as needed. (Patient not taking: Reported on 2021)       No current facility-administered medications on file prior to visit.        Allergies   Allergen Reactions    Percocet [Oxycodone-Acetaminophen] Rash     After being on it for a month    Aspirin Hives     But can take advil    Penicillins Rash       Past Medical History:   Diagnosis Date    Cervical polyp     no HPV    Genital herpes     in past,no flare up    Tuberculosis     TOOK FULL COURSE OF INH    Tumor cells        Past Surgical History:   Procedure Laterality Date    HX DILATION AND CURETTAGE      FOR UTERINE HEMORRHAGE - spontaneous     HX GYN  2003    colposcopy - polyps    HX GYN      A1       Family History   Problem Relation Age of Onset    Hypertension Mother     High Cholesterol Mother     Diabetes Mother     Cancer Mother         basal cell skin ca    Heart Failure Father         rheumatoid heart disease    Heart Disease Father     Cancer Sister     Thyroid Disease Sister         hypothyroid       Social History     Socioeconomic History    Marital status:      Spouse name: Not on file    Number of children: Not on file    Years of education: Not on file    Highest education level: Not on file   Occupational History    Not on file   Tobacco Use    Smoking status: Former Smoker     Packs/day: 1.00     Quit date: 2007     Years since quittin.3    Smokeless tobacco: Never Used   Vaping Use    Vaping Use: Never used   Substance and Sexual Activity    Alcohol use: Yes     Comment: SOCIAL    Drug use: No    Sexual activity: Never     Partners: Male   Other Topics Concern    Not on file   Social History Narrative    Not on file     Social Determinants of Health     Financial Resource Strain:     Difficulty of Paying Living Expenses:    Food Insecurity:     Worried About Running Out of Food in the Last Year:     Ran Out of Food in the Last Year:    Transportation Needs:     Lack of Transportation (Medical):      Lack of Transportation (Non-Medical):    Physical Activity:     Days of Exercise per Week:     Minutes of Exercise per Session:    Stress:     Feeling of Stress :    Social Connections:     Frequency of Communication with Friends and Family:     Frequency of Social Gatherings with Friends and Family:     Attends Mu-ism Services:     Active Member of Clubs or Organizations:     Attends Club or Organization Meetings:     Marital Status:    Intimate Partner Violence:     Fear of Current or Ex-Partner:     Emotionally Abused:     Physically Abused:     Sexually Abused:        Office Visit on 2021   Component Date Value Ref Range Status    Color (UA POC) 2021 Dark Yellow   Final    Clarity (UA POC) 2021 Slightly Cloudy   Final    Glucose (UA POC) 2021 Negative  Negative Final    Bilirubin (UA POC) 2021 Negative  Negative Final    Ketones (UA POC) 2021 Negative  Negative Final    Specific gravity (UA POC) 2021 1.030  1.001 - 1.035 Final    Blood (UA POC) 2021 1+  Negative Final    pH (UA POC) 2021 5.5  4.6 - 8.0 Final    Protein (UA POC) 2021 Negative  Negative Final    Urobilinogen (UA POC) 2021 0.2 mg/dL  0.2 - 1 Final    Nitrites (UA POC) 2021 Negative  Negative Final    Leukocyte esterase (UA POC) 09/21/2021 Negative  Negative Final   Office Visit on 08/03/2021   Component Date Value Ref Range Status    Hep C virus Ab Interp. 08/04/2021 NONREACTIVE  NONREACTIVE   Final    Method used is Siemens Advia Centaur    Cholesterol, total 08/04/2021 205* <200 MG/DL Final    Triglyceride 08/04/2021 181* <150 MG/DL Final    Comment: Based on NCEP-ATP III:  Triglycerides <150 mg/dL  is considered normal, 150-199  mg/dL  borderline high,  200-499 mg/dL high and  greater than or equal to 500  mg/dL very high.  HDL Cholesterol 08/04/2021 51  MG/DL Final    Comment: Based on NCEP ATP III, HDL Cholesterol <40 mg/dL is considered low and >60  mg/dL is elevated.       LDL, calculated 08/04/2021 117.8* 0 - 100 MG/DL Final    Comment: Based on the NCEP-ATP: LDL-C concentrations are considered  optimal <100 mg/dL,  near optimal/above Normal 100-129 mg/dL Borderline High: 130-159, High: 160-189  mg/dL Very High: Greater than or equal to 190 mg/dL      VLDL, calculated 08/04/2021 36.2  MG/DL Final    CHOL/HDL Ratio 08/04/2021 4.0  0.0 - 5.0   Final    WBC 08/04/2021 6.4  3.6 - 11.0 K/uL Final    RBC 08/04/2021 4.77  3.80 - 5.20 M/uL Final    HGB 08/04/2021 14.1  11.5 - 16.0 g/dL Final    HCT 08/04/2021 44.0  35.0 - 47.0 % Final    MCV 08/04/2021 92.2  80.0 - 99.0 FL Final    MCH 08/04/2021 29.6  26.0 - 34.0 PG Final    MCHC 08/04/2021 32.0  30.0 - 36.5 g/dL Final    RDW 08/04/2021 12.7  11.5 - 14.5 % Final    PLATELET 03/21/0715 137  150 - 400 K/uL Final    MPV 08/04/2021 12.0  8.9 - 12.9 FL Final    NRBC 08/04/2021 0.0  0  WBC Final    ABSOLUTE NRBC 08/04/2021 0.00  0.00 - 0.01 K/uL Final    Sodium 08/04/2021 141  136 - 145 mmol/L Final    Potassium 08/04/2021 4.1  3.5 - 5.1 mmol/L Final    Chloride 08/04/2021 109* 97 - 108 mmol/L Final    CO2 08/04/2021 28  21 - 32 mmol/L Final    Anion gap 08/04/2021 4* 5 - 15 mmol/L Final    Glucose 08/04/2021 101* 65 - 100 mg/dL Final    BUN 08/04/2021 14  6 - 20 MG/DL Final    Creatinine 08/04/2021 0.68  0.55 - 1.02 MG/DL Final    BUN/Creatinine ratio 08/04/2021 21* 12 - 20   Final    GFR est AA 08/04/2021 >60  >60 ml/min/1.73m2 Final    GFR est non-AA 08/04/2021 >60  >60 ml/min/1.73m2 Final    Comment: Estimated GFR is calculated using the IDMS-traceable Modification of Diet in  Renal Disease (MDRD) Study equation, reported for both  Americans  (GFRAA) and non- Americans (GFRNA), and normalized to 1.73m2 body  surface area. The physician must decide which value applies to the patient.  Calcium 08/04/2021 9.0  8.5 - 10.1 MG/DL Final    Bilirubin, total 08/04/2021 0.5  0.2 - 1.0 MG/DL Final    ALT (SGPT) 08/04/2021 27  12 - 78 U/L Final    AST (SGOT) 08/04/2021 15  15 - 37 U/L Final    Alk. phosphatase 08/04/2021 85  45 - 117 U/L Final    Protein, total 08/04/2021 7.1  6.4 - 8.2 g/dL Final    Albumin 08/04/2021 3.7  3.5 - 5.0 g/dL Final    Globulin 08/04/2021 3.4  2.0 - 4.0 g/dL Final    A-G Ratio 08/04/2021 1.1  1.1 - 2.2   Final       Review of Systems   Constitutional: Negative for activity change, fatigue and unexpected weight change. HENT: Negative for congestion, hearing loss, rhinorrhea and sore throat. Eyes: Negative for discharge. Respiratory: Negative for cough, chest tightness and shortness of breath. Cardiovascular: Negative for leg swelling. Gastrointestinal: Positive for nausea. Negative for abdominal pain, constipation and diarrhea. Genitourinary: Positive for flank pain. Negative for dysuria, frequency and urgency. Musculoskeletal: Positive for back pain. Negative for arthralgias and myalgias. Skin: Negative for color change and rash. Neurological: Negative for dizziness, light-headedness and headaches. Psychiatric/Behavioral: Negative for dysphoric mood and sleep disturbance. The patient is not nervous/anxious.       Visit Vitals  /74 (BP 1 Location: Left arm, BP Patient Position: Sitting, BP Cuff Size: Small adult)   Pulse 78   Temp 98.3 °F (36.8 °C) (Oral)   Resp 16   Ht 5' (1.524 m)   Wt 146 lb 12.8 oz (66.6 kg)   LMP 03/09/2010   SpO2 98%   BMI 28.67 kg/m²       Physical Exam  Vitals and nursing note reviewed. Constitutional:       General: She is not in acute distress. Appearance: Normal appearance. She is well-developed. She is not diaphoretic. HENT:      Right Ear: External ear normal.      Left Ear: External ear normal.   Eyes:      General: No scleral icterus. Right eye: No discharge. Left eye: No discharge. Extraocular Movements: Extraocular movements intact. Conjunctiva/sclera: Conjunctivae normal.   Cardiovascular:      Rate and Rhythm: Normal rate and regular rhythm. Pulmonary:      Effort: Pulmonary effort is normal.      Breath sounds: Normal breath sounds. No wheezing. Abdominal:      General: Bowel sounds are normal.      Palpations: Abdomen is soft. Tenderness: There is no abdominal tenderness. Musculoskeletal:      Cervical back: Normal range of motion and neck supple. Lymphadenopathy:      Cervical: No cervical adenopathy. Neurological:      Mental Status: She is alert and oriented to person, place, and time. Psychiatric:         Mood and Affect: Mood and affect normal.             An electronic signature was used to authenticate this note.   -- Prosper Mcmahon

## 2021-09-21 NOTE — PROGRESS NOTES
Chief Complaint   Patient presents with    LOW BACK PAIN     for 3 days    Nausea     Visit Vitals  /74 (BP 1 Location: Left arm, BP Patient Position: Sitting, BP Cuff Size: Small adult)   Pulse 78   Temp 98.3 °F (36.8 °C) (Oral)   Resp 16   Ht 5' (1.524 m)   Wt 146 lb 12.8 oz (66.6 kg)   SpO2 98%   BMI 28.67 kg/m²     1. Have you been to the ER, urgent care clinic since your last visit? Hospitalized since your last visit?no    2. Have you seen or consulted any other health care providers outside of the [address] [place] [address] since your last visit? Include any pap smears or colon screening.  no

## 2021-09-22 ENCOUNTER — TELEPHONE (OUTPATIENT)
Dept: PRIMARY CARE CLINIC | Age: 66
End: 2021-09-22

## 2021-09-22 NOTE — TELEPHONE ENCOUNTER
Patient calling stating she needs a letter for work. Patient states she cannot work for the rest of the week due to her pain.

## 2021-09-22 NOTE — TELEPHONE ENCOUNTER
Spoke with patient. She stated that she went to work today and had to leave due to her pain level. Requested a letter/work excuse for a few days. Letter completed, patient will stop by office to .

## 2021-09-22 NOTE — LETTER
NOTIFICATION RETURN TO WORK / SCHOOL    9/22/2021     Ms. Aylin Ferrer  [id number] [id number]      To Whom It May Concern:    Aylin Ferrer is currently under the care of Pablo Mcpherson. She was seen in office on September 21, 2021    She will return to work/school on: Monday September 27, 2021    If there are questions or concerns please have the patient contact our office.         Sincerely,      Jennifer Calvert MD          Sincerely,      Jennifer Calvert MD

## 2021-09-23 ENCOUNTER — HOSPITAL ENCOUNTER (OUTPATIENT)
Dept: ULTRASOUND IMAGING | Age: 66
Discharge: HOME | End: 2021-09-23
Payer: MEDICARE

## 2021-09-23 DIAGNOSIS — R31.29 OTHER MICROSCOPIC HEMATURIA: ICD-10-CM

## 2021-09-23 DIAGNOSIS — M54.50 ACUTE RIGHT-SIDED LOW BACK PAIN WITHOUT SCIATICA: ICD-10-CM

## 2021-09-23 DIAGNOSIS — R10.9 FLANK PAIN: ICD-10-CM

## 2021-09-23 PROCEDURE — 76770 US EXAM ABDO BACK WALL COMP: CPT | Performed by: INTERNAL MEDICINE

## 2021-09-24 DIAGNOSIS — N13.4 HYDROURETER ON LEFT: Primary | ICD-10-CM

## 2021-09-27 ENCOUNTER — TELEPHONE (OUTPATIENT)
Dept: PRIMARY CARE CLINIC | Age: 66
End: 2021-09-27

## 2021-09-27 NOTE — TELEPHONE ENCOUNTER
----- Message from Dipti Zamora sent at 9/27/2021  3:18 PM EDT -----  Regarding: /telephone  Patient return call    Caller's first and last name and relationship (if not the patient): n/a      Best contact number(s):[phone]      Whose call is being returned: Dr. Magaly Bell      Details to clarify the request: [id number]

## 2021-09-27 NOTE — TELEPHONE ENCOUNTER
Spoke with patient on the phone regarding the Ultra sound.  Currently feeling a lot better- but still needing to complete the medication given Mother's Bedside/Non-

## 2021-12-28 NOTE — PROGRESS NOTES
2nd voice message left for patient.
Left a voicemail to call back and set up My account. Her US showed small enlargement of left Ureter. If she is still having pain on left side I would recommend seeing a Urologist.Referral placed in case she has no preference.
Left message
there is no prior EKG available for comparison

## 2022-05-10 ENCOUNTER — OFFICE VISIT (OUTPATIENT)
Dept: PRIMARY CARE CLINIC | Age: 67
End: 2022-05-10
Payer: MEDICARE

## 2022-05-10 VITALS
HEIGHT: 60 IN | SYSTOLIC BLOOD PRESSURE: 138 MMHG | WEIGHT: 141.8 LBS | TEMPERATURE: 97.3 F | RESPIRATION RATE: 16 BRPM | OXYGEN SATURATION: 95 % | HEART RATE: 56 BPM | BODY MASS INDEX: 27.84 KG/M2 | DIASTOLIC BLOOD PRESSURE: 71 MMHG

## 2022-05-10 DIAGNOSIS — R73.02 IGT (IMPAIRED GLUCOSE TOLERANCE): ICD-10-CM

## 2022-05-10 DIAGNOSIS — L29.0 ANAL ITCHING: Primary | ICD-10-CM

## 2022-05-10 DIAGNOSIS — K64.4 HEMORRHOIDAL SKIN TAG: ICD-10-CM

## 2022-05-10 DIAGNOSIS — E78.2 MIXED HYPERLIPIDEMIA: ICD-10-CM

## 2022-05-10 PROCEDURE — G8399 PT W/DXA RESULTS DOCUMENT: HCPCS | Performed by: INTERNAL MEDICINE

## 2022-05-10 PROCEDURE — 1101F PT FALLS ASSESS-DOCD LE1/YR: CPT | Performed by: INTERNAL MEDICINE

## 2022-05-10 PROCEDURE — G8419 CALC BMI OUT NRM PARAM NOF/U: HCPCS | Performed by: INTERNAL MEDICINE

## 2022-05-10 PROCEDURE — G8536 NO DOC ELDER MAL SCRN: HCPCS | Performed by: INTERNAL MEDICINE

## 2022-05-10 PROCEDURE — G9899 SCRN MAM PERF RSLTS DOC: HCPCS | Performed by: INTERNAL MEDICINE

## 2022-05-10 PROCEDURE — 99214 OFFICE O/P EST MOD 30 MIN: CPT | Performed by: INTERNAL MEDICINE

## 2022-05-10 PROCEDURE — 1090F PRES/ABSN URINE INCON ASSESS: CPT | Performed by: INTERNAL MEDICINE

## 2022-05-10 PROCEDURE — G8510 SCR DEP NEG, NO PLAN REQD: HCPCS | Performed by: INTERNAL MEDICINE

## 2022-05-10 PROCEDURE — G8427 DOCREV CUR MEDS BY ELIG CLIN: HCPCS | Performed by: INTERNAL MEDICINE

## 2022-05-10 PROCEDURE — 3017F COLORECTAL CA SCREEN DOC REV: CPT | Performed by: INTERNAL MEDICINE

## 2022-05-10 RX ORDER — HYDROCORTISONE 25 MG/G
CREAM TOPICAL 2 TIMES DAILY
Qty: 28 G | Refills: 1 | Status: SHIPPED | OUTPATIENT
Start: 2022-05-10 | End: 2022-05-10 | Stop reason: SDUPTHER

## 2022-05-10 RX ORDER — SPIRONOLACTONE 25 MG
TABLET ORAL
COMMUNITY

## 2022-05-10 RX ORDER — HYDROCORTISONE 25 MG/G
CREAM TOPICAL 2 TIMES DAILY
Qty: 28 G | Refills: 1 | Status: SHIPPED | OUTPATIENT
Start: 2022-05-10 | End: 2022-06-09

## 2022-05-10 NOTE — PROGRESS NOTES
Chief Complaint   Patient presents with    Other     anal itching and burning with bright red blood     3 most recent PHQ Screens 5/10/2022   Little interest or pleasure in doing things Not at all   Feeling down, depressed, irritable, or hopeless Not at all   Total Score PHQ 2 0     Abuse Screening Questionnaire 5/10/2022   Do you ever feel afraid of your partner? N   Are you in a relationship with someone who physically or mentally threatens you? N   Is it safe for you to go home? Y     Visit Vitals  /71 (BP 1 Location: Left upper arm, BP Patient Position: Sitting, BP Cuff Size: Small adult)   Pulse (!) 56   Temp 97.3 °F (36.3 °C) (Temporal)   Resp 16   Ht 5' (1.524 m)   Wt 141 lb 12.8 oz (64.3 kg)   SpO2 95%   BMI 27.69 kg/m²     1. \"Have you been to the ER, urgent care clinic since your last visit? Hospitalized since your last visit? \" no    2. \"Have you seen or consulted any other health care providers outside of the [address] [place] [address] since your last visit? \" no    3. For patients aged 39-70: Has the patient had a colonoscopy / FIT/ Cologuard? due      If the patient is female:    4. For patients aged 41-77: Has the patient had a mammogram within the past 2 years? 11/20/20      5. For patients aged 21-65: Has the patient had a pap smear?

## 2022-05-10 NOTE — PROGRESS NOTES
Bard Leonardo (: 1955) is a 79 y.o. female, established patient, here for evaluation of the following chief complaint(s): Other (anal itching and burning with bright red blood)     Written by Reyna Key, as dictated by Dr. Marjorie Kingsley MD.      ASSESSMENT/PLAN:  Below is the assessment and plan developed based on review of pertinent history, physical exam, labs, studies, and medications. 1. Anal itching  I rx'd hydrocortisone 2.5% rectal cream. Potential side effects were discussed. -     hydrocortisone (ANUSOL-HC) 2.5 % rectal cream; Insert  into rectum two (2) times a day for 30 days. , Print, Disp-28 g, R-1 sent to pharmacy. 2. Hemorrhoidal skin tag  I rx'd hydrocortisone 2.5% rectal cream. Potential side effects were discussed. -     hydrocortisone (ANUSOL-HC) 2.5 % rectal cream; Insert  into rectum two (2) times a day for 30 days. , Print, Disp-28 g, R-1 sent to pharmacy. 3. Mixed hyperlipidemia  Recheck CMP, CBC, and lipid panel. She will come back Friday to have lab work done. -     METABOLIC PANEL, COMPREHENSIVE; Future  -     CBC W/O DIFF; Future  -     LIPID PANEL; Future    4. IGT (impaired glucose tolerance)  Recheck hgb A1c.   -     HEMOGLOBIN A1C WITH EAG; Future    SUBJECTIVE/OBJECTIVE:  HPI   The patient presents today c/o anal itching. She has noticed blood in her toilet paper a couple times. She has a hx of anal fissure and feels like something is coming out. She denies any pain, constipation, or diarrhea. She has not done blood work in almost a year. 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 She will come back on Friday to have blood work done.      Patient Active Problem List   Diagnosis Code    H/O TB (tuberculosis) Z86.11    Mixed hyperlipidemia E78.2    IGT (impaired glucose tolerance) R73.02        Current Outpatient Medications on File Prior to Visit   Medication Sig Dispense Refill  Calcium-Cholecalciferol, D3, (Calcium 600 with Vitamin D3) 600 mg-10 mcg (400 unit) chew Take  by mouth.  [DISCONTINUED] TURMERIC ROOT EXTRACT PO Take  by mouth. (Patient not taking: Reported on 8/3/2021)      [DISCONTINUED] MULTIVIT WITH CALCIUM,IRON,MIN [facility] DAILY MULTIVITAMIN PO) Take  by mouth. (Patient not taking: Reported on 5/10/2022)      [DISCONTINUED] acetaminophen (TYLENOL EX STR ARTHRITIS PAIN) 500 mg tablet Take  by mouth every six (6) hours as needed. (Patient not taking: Reported on 2021)       No current facility-administered medications on file prior to visit.        Allergies   Allergen Reactions    Percocet [Oxycodone-Acetaminophen] Rash     After being on it for a month    Aspirin Hives     But can take advil    Penicillins Rash       Past Medical History:   Diagnosis Date    Cervical polyp     no HPV    Genital herpes     in past,no flare up    Tuberculosis     TOOK FULL COURSE OF INH    Tumor cells        Past Surgical History:   Procedure Laterality Date    HX DILATION AND CURETTAGE      FOR UTERINE HEMORRHAGE - spontaneous     HX GYN  2003    colposcopy - polyps    HX GYN      A1       Family History   Problem Relation Age of Onset    Hypertension Mother     High Cholesterol Mother     Diabetes Mother     Cancer Mother         basal cell skin ca    Heart Failure Father         rheumatoid heart disease    Heart Disease Father     Cancer Sister     Thyroid Disease Sister         hypothyroid       Social History     Socioeconomic History    Marital status:      Spouse name: Not on file    Number of children: Not on file    Years of education: Not on file    Highest education level: Not on file   Occupational History    Not on file   Tobacco Use    Smoking status: Former Smoker     Packs/day: 1.00     Quit date: 2007     Years since quitting: 15.0    Smokeless tobacco: Never Used   Vaping Use    Vaping Use: Never used   Substance and Sexual Activity    Alcohol use: Yes     Comment: SOCIAL    Drug use: No    Sexual activity: Never     Partners: Male   Other Topics Concern    Not on file   Social History Narrative    Not on file       No visits with results within 3 Month(s) from this visit. Latest known visit with results is:   Office Visit on 09/21/2021   Component Date Value Ref Range Status    Color (UA POC) 09/21/2021 Dark Yellow   Final    Clarity (UA POC) 09/21/2021 Slightly Cloudy   Final    Glucose (UA POC) 09/21/2021 Negative  Negative Final    Bilirubin (UA POC) 09/21/2021 Negative  Negative Final    Ketones (UA POC) 09/21/2021 Negative  Negative Final    Specific gravity (UA POC) 09/21/2021 1.030  1.001 - 1.035 Final    Blood (UA POC) 09/21/2021 1+  Negative Final    pH (UA POC) 09/21/2021 5.5  4.6 - 8.0 Final    Protein (UA POC) 09/21/2021 Negative  Negative Final    Urobilinogen (UA POC) 09/21/2021 0.2 mg/dL  0.2 - 1 Final    Nitrites (UA POC) 09/21/2021 Negative  Negative Final    Leukocyte esterase (UA POC) 09/21/2021 Negative  Negative Final      Review of Systems   Constitutional: Negative for activity change, fatigue and unexpected weight change. HENT: Negative for congestion, hearing loss, rhinorrhea and sore throat. Eyes: Negative for discharge. Respiratory: Negative for cough, chest tightness and shortness of breath. Cardiovascular: Negative for leg swelling. Gastrointestinal: Positive for blood in stool (blood on toilet paper). Negative for abdominal pain, constipation and diarrhea.        +anal itching   Genitourinary: Negative for dysuria, flank pain, frequency and urgency. Musculoskeletal: Negative for arthralgias, back pain and myalgias. Skin: Negative for color change and rash. Neurological: Negative for dizziness, light-headedness and headaches. Psychiatric/Behavioral: Negative for dysphoric mood and sleep disturbance. The patient is not nervous/anxious. Visit Vitals  /71 (BP 1 Location: Left upper arm, BP Patient Position: Sitting, BP Cuff Size: Small adult)   Pulse (!) 56   Temp 97.3 °F (36.3 °C) (Temporal)   Resp 16   Ht 5' (1.524 m)   Wt 141 lb 12.8 oz (64.3 kg)   LMP 03/09/2010   SpO2 95%   BMI 27.69 kg/m²      Physical Exam  Vitals and nursing note reviewed. Constitutional:       General: She is not in acute distress. Appearance: Normal appearance. She is well-developed. She is not diaphoretic. HENT:      Right Ear: External ear normal.      Left Ear: External ear normal.   Eyes:      General: No scleral icterus. Right eye: No discharge. Left eye: No discharge. Extraocular Movements: Extraocular movements intact. Conjunctiva/sclera: Conjunctivae normal.   Cardiovascular:      Rate and Rhythm: Normal rate and regular rhythm. Pulmonary:      Effort: Pulmonary effort is normal.      Breath sounds: Normal breath sounds. No wheezing. Abdominal:      General: Bowel sounds are normal.      Palpations: Abdomen is soft. Tenderness: There is no abdominal tenderness. Genitourinary:     Rectum: External hemorrhoid present. Comments: +hemorrhoidal skin tag  Musculoskeletal:      Cervical back: Normal range of motion and neck supple. Lymphadenopathy:      Cervical: No cervical adenopathy. Neurological:      Mental Status: She is alert and oriented to person, place, and time. Psychiatric:         Mood and Affect: Mood and affect normal.       An electronic signature was used to authenticate this note.   -- Radha Borjas

## 2022-10-05 DIAGNOSIS — R10.13 EPIGASTRIC PAIN: ICD-10-CM

## 2022-10-05 RX ORDER — OMEPRAZOLE 40 MG/1
40 CAPSULE, DELAYED RELEASE ORAL DAILY
Qty: 30 CAPSULE | Refills: 0 | Status: SHIPPED | OUTPATIENT
Start: 2022-10-05 | End: 2022-10-28

## 2022-10-05 NOTE — TELEPHONE ENCOUNTER
Called patient, made an appointment for 10/18/22 patient is having Epigastric pain. Is wanting to speak with Dr Anurag Noriega about it, patient is also wanting to see if it's appropriate for a refill for Omeprazole      Requested Prescriptions     Pending Prescriptions Disp Refills    omeprazole (PRILOSEC) 40 mg capsule 30 Capsule 0     Sig: Take 1 Capsule by mouth daily for 30 days.         Last Visit 5/10/22  Last Refill 8/3/21

## 2022-10-05 NOTE — TELEPHONE ENCOUNTER
Patient has stomach pain and wanted to schedule appointment to see Dr Ulises Alejandre but when I told her the first available was the 17th she asked if one of her nurses could call her about her pain. She said she was supposed to get xrays done and never did them.

## 2022-10-18 ENCOUNTER — OFFICE VISIT (OUTPATIENT)
Dept: PRIMARY CARE CLINIC | Age: 67
End: 2022-10-18
Payer: MEDICARE

## 2022-10-18 VITALS
TEMPERATURE: 97.5 F | OXYGEN SATURATION: 96 % | WEIGHT: 141.4 LBS | HEIGHT: 60 IN | BODY MASS INDEX: 27.76 KG/M2 | RESPIRATION RATE: 16 BRPM | SYSTOLIC BLOOD PRESSURE: 135 MMHG | HEART RATE: 56 BPM | DIASTOLIC BLOOD PRESSURE: 81 MMHG

## 2022-10-18 DIAGNOSIS — R73.02 IGT (IMPAIRED GLUCOSE TOLERANCE): ICD-10-CM

## 2022-10-18 DIAGNOSIS — Z00.00 MEDICARE ANNUAL WELLNESS VISIT, SUBSEQUENT: Primary | ICD-10-CM

## 2022-10-18 DIAGNOSIS — E78.2 MIXED HYPERLIPIDEMIA: ICD-10-CM

## 2022-10-18 DIAGNOSIS — Z12.31 ENCOUNTER FOR SCREENING MAMMOGRAM FOR MALIGNANT NEOPLASM OF BREAST: ICD-10-CM

## 2022-10-18 DIAGNOSIS — R10.13 EPIGASTRIC PAIN: ICD-10-CM

## 2022-10-18 DIAGNOSIS — Z71.89 ACP (ADVANCE CARE PLANNING): ICD-10-CM

## 2022-10-18 DIAGNOSIS — Z12.11 COLON CANCER SCREENING: ICD-10-CM

## 2022-10-18 DIAGNOSIS — K21.9 GASTROESOPHAGEAL REFLUX DISEASE WITHOUT ESOPHAGITIS: ICD-10-CM

## 2022-10-18 LAB
ALBUMIN SERPL-MCNC: 3.7 G/DL (ref 3.5–5)
ALBUMIN/GLOB SERPL: 1 {RATIO} (ref 1.1–2.2)
ALP SERPL-CCNC: 78 U/L (ref 45–117)
ALT SERPL-CCNC: 24 U/L (ref 12–78)
ANION GAP SERPL CALC-SCNC: 4 MMOL/L (ref 5–15)
AST SERPL-CCNC: 18 U/L (ref 15–37)
BILIRUB SERPL-MCNC: 0.5 MG/DL (ref 0.2–1)
BUN SERPL-MCNC: 13 MG/DL (ref 6–20)
BUN/CREAT SERPL: 19 (ref 12–20)
CALCIUM SERPL-MCNC: 9.4 MG/DL (ref 8.5–10.1)
CHLORIDE SERPL-SCNC: 107 MMOL/L (ref 97–108)
CHOLEST SERPL-MCNC: 208 MG/DL
CO2 SERPL-SCNC: 29 MMOL/L (ref 21–32)
CREAT SERPL-MCNC: 0.68 MG/DL (ref 0.55–1.02)
ERYTHROCYTE [DISTWIDTH] IN BLOOD BY AUTOMATED COUNT: 12.6 % (ref 11.5–14.5)
EST. AVERAGE GLUCOSE BLD GHB EST-MCNC: 120 MG/DL
GLOBULIN SER CALC-MCNC: 3.6 G/DL (ref 2–4)
GLUCOSE SERPL-MCNC: 84 MG/DL (ref 65–100)
HBA1C MFR BLD: 5.8 % (ref 4–5.6)
HCT VFR BLD AUTO: 44.8 % (ref 35–47)
HDLC SERPL-MCNC: 62 MG/DL
HDLC SERPL: 3.4 {RATIO} (ref 0–5)
HGB BLD-MCNC: 14.4 G/DL (ref 11.5–16)
LDLC SERPL CALC-MCNC: 116.8 MG/DL (ref 0–100)
MCH RBC QN AUTO: 30.2 PG (ref 26–34)
MCHC RBC AUTO-ENTMCNC: 32.1 G/DL (ref 30–36.5)
MCV RBC AUTO: 93.9 FL (ref 80–99)
NRBC # BLD: 0 K/UL (ref 0–0.01)
NRBC BLD-RTO: 0 PER 100 WBC
PLATELET # BLD AUTO: 277 K/UL (ref 150–400)
PMV BLD AUTO: 11.2 FL (ref 8.9–12.9)
POTASSIUM SERPL-SCNC: 4.3 MMOL/L (ref 3.5–5.1)
PROT SERPL-MCNC: 7.3 G/DL (ref 6.4–8.2)
RBC # BLD AUTO: 4.77 M/UL (ref 3.8–5.2)
SODIUM SERPL-SCNC: 140 MMOL/L (ref 136–145)
TRIGL SERPL-MCNC: 146 MG/DL (ref ?–150)
VLDLC SERPL CALC-MCNC: 29.2 MG/DL
WBC # BLD AUTO: 6.2 K/UL (ref 3.6–11)

## 2022-10-18 PROCEDURE — 1090F PRES/ABSN URINE INCON ASSESS: CPT | Performed by: INTERNAL MEDICINE

## 2022-10-18 PROCEDURE — G9899 SCRN MAM PERF RSLTS DOC: HCPCS | Performed by: INTERNAL MEDICINE

## 2022-10-18 PROCEDURE — 1101F PT FALLS ASSESS-DOCD LE1/YR: CPT | Performed by: INTERNAL MEDICINE

## 2022-10-18 PROCEDURE — G8419 CALC BMI OUT NRM PARAM NOF/U: HCPCS | Performed by: INTERNAL MEDICINE

## 2022-10-18 PROCEDURE — G8399 PT W/DXA RESULTS DOCUMENT: HCPCS | Performed by: INTERNAL MEDICINE

## 2022-10-18 PROCEDURE — G0439 PPPS, SUBSEQ VISIT: HCPCS | Performed by: INTERNAL MEDICINE

## 2022-10-18 PROCEDURE — G8427 DOCREV CUR MEDS BY ELIG CLIN: HCPCS | Performed by: INTERNAL MEDICINE

## 2022-10-18 PROCEDURE — 3017F COLORECTAL CA SCREEN DOC REV: CPT | Performed by: INTERNAL MEDICINE

## 2022-10-18 PROCEDURE — 99214 OFFICE O/P EST MOD 30 MIN: CPT | Performed by: INTERNAL MEDICINE

## 2022-10-18 PROCEDURE — G8432 DEP SCR NOT DOC, RNG: HCPCS | Performed by: INTERNAL MEDICINE

## 2022-10-18 PROCEDURE — G8536 NO DOC ELDER MAL SCRN: HCPCS | Performed by: INTERNAL MEDICINE

## 2022-10-18 PROCEDURE — 1123F ACP DISCUSS/DSCN MKR DOCD: CPT | Performed by: INTERNAL MEDICINE

## 2022-10-18 RX ORDER — SUCRALFATE 1 G/1
1 TABLET ORAL 3 TIMES DAILY
Qty: 90 TABLET | Refills: 0 | Status: SHIPPED | OUTPATIENT
Start: 2022-10-18 | End: 2022-11-17

## 2022-10-18 NOTE — PROGRESS NOTES
Chief Complaint   Patient presents with    GERD    Epigastric Pain       Visit Vitals  /81 (BP 1 Location: Left upper arm)   Pulse (!) 56   Temp 97.5 °F (36.4 °C)   Resp 16   Ht 5' (1.524 m)   Wt 141 lb 6.4 oz (64.1 kg)   LMP 03/09/2010   SpO2 96%   BMI 27.62 kg/m²       1. Have you been to the ER, urgent care clinic since your last visit? Hospitalized since your last visit? Yes [address] for a Tetnus shot    2. Have you seen or consulted any other health care providers outside of the [address] [place] [address] since your last visit? Include any pap smears or colon screening. No      3. For patients aged 39-70: Has the patient had a colonoscopy / FIT/ Cologuard? Yes - Care Gap present. Most recent result on file      If the patient is female:    4. For patients aged 41-77: Has the patient had a mammogram within the past 2 years? No      5. For patients aged 21-65: Has the patient had a pap smear? No        Brandee Clay is a 79 y.o. female and presents for Annual Medicare Wellness Visit. Assessment of cognitive impairment: Alert and oriented x 4. Depression Screen:   3 most recent PHQ Screens 5/10/2022   Little interest or pleasure in doing things Not at all   Feeling down, depressed, irritable, or hopeless Not at all   Total Score PHQ 2 0       Fall Risk Assessment:    Fall Risk Assessment, last 12 mths 5/10/2022   Able to walk? Yes   Fall in past 12 months? 0   Do you feel unsteady? -   Are you worried about falling -   Number of falls in past 12 months -   Fall with injury? -       Abuse Screen:   Abuse Screening Questionnaire 10/18/2022   Do you ever feel afraid of your partner? N   Are you in a relationship with someone who physically or mentally threatens you? N   Is it safe for you to go home? Y       Activities of Daily Living:  Self-care.    Requires assistance with: no ADLs  Patient handle his/her own medications  Yes Use of pill box  no  Activities of Daily Living:   ADL Assessment 10/18/2022   Feeding yourself No Help Needed   Getting from bed to chair No Help Needed   Getting dressed No Help Needed   Bathing or showering No Help Needed   Walk across the room (includes cane/walker) No Help Needed   Using the telphone No Help Needed   Taking your medications No Help Needed   Preparing meals No Help Needed   Managing money (expenses/bills) No Help Needed   Moderately strenuous housework (laundry) No Help Needed   Shopping for personal items (toiletries/medicines) No Help Needed   Shopping for groceries No Help Needed   Driving No Help Needed   Climbing a flight of stairs No Help Needed   Getting to places beyond walking distances No Help Needed       Health Maintenance:  Daily Aspirin: no  Bone Density: up to date  Glaucoma Screening: no, over 3 years  Immunizations:    Tetanus: states she got it in July- not on VIIS. Influenza: not up to date - declined . Shingles: not up to date - declined . PPSV-23: not up to date - declined . [id number]: Needs booster    Cancer screening:    Cervical: does not have this done anymore. Breast: last one in 2020, new order placed  Colon: not up to date - FIT test ordered . Alcohol Risk Screen:   On any occasion during the past 3 months, have you had more than 3 drinks(female) or 4 drinks (male) containing alcohol in one? No  Do you average more than 7 drinks (female) or 14 drinks (male) per week? No  Type and amount:0 Beers    Hearing Loss:  denies any hearing loss    Vision Loss:   Wears glasses, contact lenses, or have any other visual impairment  Yes    Adult Nutrition Screen:  No risk factors noted. Advance Care Planning:   End of Life Planning: has NO advanced directive - additional information provided. [address] ACP-Facilitator appointment yes       Medications/Allergies: Reviewed with patient  Prior to Admission medications    Medication Sig Start Date End Date Taking?  Authorizing Provider   fluticasone propionate (FLONASE ALLERGY RELIEF NA) by Nasal route. prn   Yes Provider, Historical   omeprazole (PRILOSEC) 40 mg capsule Take 1 Capsule by mouth daily for 30 days. 10/5/22 11/4/22 Yes Shola Vitale MD   Calcium-Cholecalciferol, D3, (Calcium 600 with Vitamin D3) 600 mg-10 mcg (400 unit) chew Take  by mouth. Yes Provider, Historical       Allergies   Allergen Reactions    Percocet [Oxycodone-Acetaminophen] Rash     After being on it for a month    Aspirin Hives     But can take advil    Penicillins Rash       Objective:  Visit Vitals  /81 (BP 1 Location: Left upper arm)   Pulse (!) 56   Temp 97.5 °F (36.4 °C)   Resp 16   Ht 5' (1.524 m)   Wt 141 lb 6.4 oz (64.1 kg)   LMP 2010   SpO2 96%   BMI 27.62 kg/m²    Body mass index is 27.62 kg/m². Problem List: Reviewed with patient and discussed risk factors.     Patient Active Problem List   Diagnosis Code    H/O TB (tuberculosis) Z86.11    Mixed hyperlipidemia E78.2    IGT (impaired glucose tolerance) R73.02       PSH: Reviewed with patient  Past Surgical History:   Procedure Laterality Date    HX DILATION AND CURETTAGE      FOR UTERINE HEMORRHAGE - spontaneous     HX GYN  2003    colposcopy - polyps    HX GYN      A1        SH: Reviewed with patient  Social History     Tobacco Use    Smoking status: Former     Packs/day: 1.00     Types: Cigarettes     Quit date: 2007     Years since quitting: 15.4    Smokeless tobacco: Never   Vaping Use    Vaping Use: Never used   Substance Use Topics    Alcohol use: Yes     Comment: SOCIAL    Drug use: No       FH: Reviewed with patient  Family History   Problem Relation Age of Onset    Hypertension Mother     High Cholesterol Mother     Diabetes Mother     Cancer Mother         basal cell skin ca    Heart Failure Father         rheumatoid heart disease    Heart Disease Father     Cancer Sister     Thyroid Disease Sister         hypothyroid       Current medical providers:    Patient Care Team:  Katherine Valenzuela MD as PCP - General (Internal Medicine Physician)  Katherine Valenzuela MD as PCP - [facility] Empaneled Provider    Plan:    Diagnoses and all orders for this visit:    Medicare annual wellness visit, subsequent  . Age appropriate Health screening and immunization discussed with patient. She declines immunizations and is aware of the consequences of not getting certain vaccines. ACP (advance care planning)  -     REFERRAL TO ACP CLINICAL SPECIALIST    Colon cancer screening  -     OCCULT BLOOD IMMUNOASSAY,DIAGNOSTIC; Future  -     OCCULT BLOOD IMMUNOASSAY,DIAGNOSTIC    Encounter for screening mammogram for malignant neoplasm of breast  -     BALJEET 3D GERTRUDE W MAMMO BI SCREENING INCL CAD; Future             Health Maintenance   Topic Date Due    DTaP/Tdap/Td series (1 - Tdap) Never done    Shingrix Vaccine Age 50> (1 of 2) Never done    Colorectal Cancer Screening Combo  06/30/2022    Medicare Yearly Exam  08/04/2022    COVID-19 Vaccine (3 - Booster for Pfizer series) 11/23/2022 (Originally 12/10/2021)    Flu Vaccine (1) 10/26/2023 (Originally 8/1/2022)    Pneumococcal 65+ years (1 - PCV) 10/26/2023 (Originally 4/18/2020)    Breast Cancer Screen Mammogram  11/20/2022    Depression Screen  05/10/2023    Lipid Screen  08/04/2026    Hepatitis C Screening  Completed    Bone Densitometry (Dexa) Screening  Completed          Urinary/ Fecal Incontinence: No    Regular physical exercise: Walking stnading for work 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 AVS and Medicare Part B Preventive Services Table printed and given to pt and reviewed. See table for findings under Recommendation and Scheduled. All of the patient's questions were answered.

## 2022-10-18 NOTE — PROGRESS NOTES
Massiel Echeverria (: 1955) is a 79 y.o. female, established patient, here for evaluation of the following chief complaint(s):  GERD, Epigastric Pain, and Annual Wellness Visit       ASSESSMENT/PLAN:  Below is the assessment and plan developed based on review of pertinent history, physical exam, labs, studies, and medications. 1. Epigastric pain  Recommended taking Carafate 3 times a day for 2 weeks. If no improvement in her symptoms we will refer her to gastroenterologist for endoscopy. -     sucralfate (Carafate) 1 gram tablet; Take 1 Tablet by mouth three (3) times daily for 30 days. , Normal, Disp-90 Tablet, R-0  2. Mixed hyperlipidemia  -     METABOLIC PANEL, COMPREHENSIVE; Future  -     CBC W/O DIFF; Future  -     LIPID PANEL; Future  3. Gastroesophageal reflux disease without esophagitis  Recommended taking omeprazole half hour before the breakfast daily for 2 weeks. 4. IGT (impaired glucose tolerance)  -     HEMOGLOBIN A1C WITH EAG; Future          SUBJECTIVE/OBJECTIVE:  Patient seen today with epigastric pain. It started yesterday after having fried rice from a restaurant. She is not sure if it is due to the broccoli in the rice or  fried food. She has a history of GERD and she takes Prilosec as needed. She is not having any diarrhea, nausea or vomiting. She is describing her pain in the middle of upper abdomen nonradiating and is tender when she touches. We had blood work order placed for her back in May but she never came back to get it done. She works in [employer] and has a very busy schedule. She had crackers and a coffee in the morning and has not had anything since. She would like to get her blood work done. She does not take flu shots. She does not believe in vaccines.   Visit Vitals  /81 (BP 1 Location: Left upper arm)   Pulse (!) 56   Temp 97.5 °F (36.4 °C)   Resp 16   Ht 5' (1.524 m)   Wt 141 lb 6.4 oz (64.1 kg)   LMP 2010   SpO2 96%   BMI 27.62 kg/m² Current Outpatient Medications   Medication Sig Dispense Refill    fluticasone propionate (FLONASE ALLERGY RELIEF NA) by Nasal route. prn      sucralfate (Carafate) 1 gram tablet Take 1 Tablet by mouth three (3) times daily for 30 days. 90 Tablet 0    omeprazole (PRILOSEC) 40 mg capsule Take 1 Capsule by mouth daily for 30 days. 30 Capsule 0    Calcium-Cholecalciferol, D3, (Calcium 600 with Vitamin D3) 600 mg-10 mcg (400 unit) chew Take  by mouth. Past Medical History:   Diagnosis Date    Cervical polyp     no HPV    Genital herpes     in past,no flare up    Tuberculosis     TOOK FULL COURSE OF INH    Tumor cells      Past Surgical History:   Procedure Laterality Date    HX DILATION AND CURETTAGE      FOR UTERINE HEMORRHAGE - spontaneous     HX GYN  2003    colposcopy - polyps    HX GYN      A1     Lab Results   Component Value Date/Time    WBC 6.4 2021 07:21 AM    HGB 14.1 2021 07:21 AM    HCT 44.0 2021 07:21 AM    PLATELET 150  07:21 AM    MCV 92.2 2021 07:21 AM     Lab Results   Component Value Date/Time    Glucose 101 (H) 2021 07:21 AM    LDL, calculated 117.8 (H) 2021 07:21 AM    Creatinine 0.68 2021 07:21 AM      Lab Results   Component Value Date/Time    Cholesterol, total 205 (H) 2021 07:21 AM    HDL Cholesterol 51 2021 07:21 AM    LDL, calculated 117.8 (H) 2021 07:21 AM    Triglyceride 181 (H) 2021 07:21 AM    CHOL/HDL Ratio 4.0 2021 07:21 AM     Lab Results   Component Value Date/Time    ALT (SGPT) 27 2021 07:21 AM    Alk.  phosphatase 85 2021 07:21 AM    Bilirubin, total 0.5 2021 07:21 AM    Albumin 3.7 2021 07:21 AM    Protein, total 7.1 2021 07:21 AM    PLATELET 396  07:21 AM       Lab Results   Component Value Date/Time    GFR est non-AA >60 2021 07:21 AM    GFR est AA >60 2021 07:21 AM    Creatinine 0.68 2021 07:21 AM    BUN 14 08/04/2021 07:21 AM    Sodium 141 08/04/2021 07:21 AM    Potassium 4.1 08/04/2021 07:21 AM    Chloride 109 (H) 08/04/2021 07:21 AM    CO2 28 08/04/2021 07:21 AM     Lab Results   Component Value Date/Time    TSH 0.586 03/01/2016 08:02 AM          Review of Systems   Constitutional:  Negative for activity change, fatigue and unexpected weight change. HENT:  Negative for congestion, hearing loss, rhinorrhea and sore throat. Eyes:  Negative for discharge. Respiratory:  Negative for cough, chest tightness and shortness of breath. Cardiovascular:  Negative for leg swelling. Gastrointestinal:  Positive for abdominal pain. Negative for blood in stool, constipation and diarrhea. Genitourinary:  Negative for dysuria, flank pain, frequency and urgency. Musculoskeletal:  Positive for arthralgias. Negative for back pain and myalgias. Skin:  Negative for color change and rash. Neurological:  Negative for dizziness, light-headedness and headaches. Psychiatric/Behavioral:  Negative for dysphoric mood and sleep disturbance. The patient is not nervous/anxious. Physical Exam    Vitals and nursing note reviewed. Constitutional:       Appearance: Normal appearance. overweight  Eyes:      Extraocular Movements: Extraocular movements intact. Pupils: Pupils are equal, round, and reactive to light. Cardiovascular:      Rate and Rhythm: Normal rate and regular rhythm. Pulmonary:      Effort: Pulmonary effort is normal.      Breath sounds: Normal breath sounds. Abdominal:      General: Bowel sounds are normal. There is no distension. Palpations: epigastric tenderness +  Musculoskeletal:         General: No swelling. Normal range of motion. Cervical back: Normal range of motion and neck supple. Right lower leg: No edema. Left lower leg: No edema. Neurological:      General: No focal deficit present. Mental Status:  Alert and oriented to person, place, and time.       Motor: No weakness. Psychiatric:         Mood and Affect: Mood normal.         Behavior: Behavior normal.           An electronic signature was used to authenticate this note.   -- Darien Sneed MD

## 2022-10-19 ENCOUNTER — PATIENT OUTREACH (OUTPATIENT)
Dept: CASE MANAGEMENT | Age: 67
End: 2022-10-19

## 2022-10-20 NOTE — PROGRESS NOTES
Let her know cholesterol numbers came little elevated. Needs to cut down on Carbs ( Pasta , bread and Rice) and [employer]. Needs to walk 1 mile a day.

## 2022-10-21 LAB — HEMOCCULT STL QL IA: NEGATIVE

## 2022-10-21 NOTE — ACP (ADVANCE CARE PLANNING)
Advance Care Planning   Ambulatory ACP Specialist Patient Outreach    Date:  10/19/2022 10/25/22      ACP Specialist:  Maylin Painter LPN    Outreach call to patient in follow-up to ACP Specialist referral from:    [x] PCP  [] Provider   [] Ambulatory Care Management [] Other     For:                  [x] Advance Directive Assistance              [] Complete Portable DNR order              [] Complete POST/MOST              [] Code Status Discussion             [] Discuss Goals of Care             [] Early ACP Decision-Making              [] Other (Specify)    Date Referral Received: 10/18/22    Today's Outreach:  [x] First   [x] Second  [] Third       Third outreach made by: [] Phone  [] Email / mail    [] [place]     Intervention:  [x] Spoke with Patient   [x] Left VM requesting return call      Outcome: The first attempt was made to contact pt regarding the ACP referral. No answer on mobile phone. VM left requesting a return call. Will attempt second outreach within one week          Next Step:   [] ACP scheduled conversation  [x] Outreach again in one week               [x] Email / Mail [address]  [] Email / Mail Advance Directive   [x] Closing referral.  Routing closure to referring provider/staff and to ACP Specialist . [] Closure letter mailed to patient with invitation to contact ACP Specialist if / when ready. Thank you for this referral.     10/25/22  Spoke with pt who requested ACP material be mailed to her for review. Pt states that she can complete AMD on her own and does not request assistance. Pt will reach out if assistance is needed.  Will close this referral.

## 2022-10-25 ENCOUNTER — PATIENT OUTREACH (OUTPATIENT)
Dept: CASE MANAGEMENT | Age: 67
End: 2022-10-25

## 2022-10-28 DIAGNOSIS — R10.13 EPIGASTRIC PAIN: ICD-10-CM

## 2022-10-28 RX ORDER — OMEPRAZOLE 40 MG/1
CAPSULE, DELAYED RELEASE ORAL
Qty: 30 CAPSULE | Refills: 0 | Status: SHIPPED | OUTPATIENT
Start: 2022-10-28

## 2022-11-15 DIAGNOSIS — R10.13 EPIGASTRIC PAIN: ICD-10-CM

## 2022-11-15 RX ORDER — SUCRALFATE 1 G/1
1 TABLET ORAL 3 TIMES DAILY
Qty: 90 TABLET | Refills: 0 | Status: SHIPPED | OUTPATIENT
Start: 2022-11-15 | End: 2022-12-15

## 2022-12-23 ENCOUNTER — OFFICE VISIT (OUTPATIENT)
Dept: PRIMARY CARE CLINIC | Age: 67
End: 2022-12-23
Payer: MEDICARE

## 2022-12-23 VITALS
HEIGHT: 60 IN | SYSTOLIC BLOOD PRESSURE: 120 MMHG | RESPIRATION RATE: 17 BRPM | DIASTOLIC BLOOD PRESSURE: 76 MMHG | TEMPERATURE: 97.5 F | HEART RATE: 62 BPM | WEIGHT: 139.8 LBS | BODY MASS INDEX: 27.45 KG/M2 | OXYGEN SATURATION: 95 %

## 2022-12-23 DIAGNOSIS — M25.551 HIP PAIN, RIGHT: ICD-10-CM

## 2022-12-23 DIAGNOSIS — R10.31 RIGHT GROIN PAIN: Primary | ICD-10-CM

## 2022-12-23 DIAGNOSIS — M79.645 THUMB PAIN, LEFT: ICD-10-CM

## 2022-12-23 RX ORDER — DICLOFENAC SODIUM 10 MG/G
2 GEL TOPICAL 4 TIMES DAILY
Qty: 100 G | Refills: 0 | Status: SHIPPED | OUTPATIENT
Start: 2022-12-23 | End: 2023-01-22

## 2022-12-23 NOTE — PROGRESS NOTES
Shaan Caruso (: 1955) is a 79 y.o. female, established patient, here for evaluation of the following chief complaint(s):  Leg Pain and Groin Injury (2 weeks)       ASSESSMENT/PLAN:  Below is the assessment and plan developed based on review of pertinent history, physical exam, labs, studies, and medications. 1. Right groin pain  -     US EXT NONVAS RT LTD; Future  I ordered an ultrasound. Explained to her most likely due to femoral Hernia. 2. Hip pain, right  I recommend that she take tylenol. 3. Thumb pain, left  -     diclofenac (VOLTAREN) 1 % gel; Apply 2 g to affected area four (4) times daily for 30 days. , Normal, Disp-100 g, R-0 sent to pharmacy. I prescribed voltaren to be use PRN. Potential side effects were discussed. SUBJECTIVE/OBJECTIVE:  HPI  Patient presents today for groin pain. She states that she believes she pulled a muscle in her right groin 2 weeks ago. She reports that the pain has eased a bit today, but she is having trouble raising her leg. She complains that it hurts when she walks. A bump can be present. She also complains of pain in her left thumb due to using it a lot at work. Patient Active Problem List   Diagnosis Code    H/O TB (tuberculosis) Z86.11    Mixed hyperlipidemia E78.2    IGT (impaired glucose tolerance) R73.02        Current Outpatient Medications on File Prior to Visit   Medication Sig Dispense Refill    omeprazole (PRILOSEC) 40 mg capsule TAKE 1 CAPSULE BY MOUTH EVERY DAY 30 Capsule 0    fluticasone propionate (FLONASE ALLERGY RELIEF NA) by Nasal route. prn (Patient not taking: Reported on 2022)      Calcium-Cholecalciferol, D3, (Calcium 600 with Vitamin D3) 600 mg-10 mcg (400 unit) chew Take  by mouth. (Patient not taking: Reported on 2022)       No current facility-administered medications on file prior to visit.        Allergies   Allergen Reactions    Percocet [Oxycodone-Acetaminophen] Rash     After being on it for a month Aspirin Hives     But can take advil    Penicillins Rash       Past Medical History:   Diagnosis Date    Cervical polyp     no HPV    Genital herpes     in past,no flare up    Tuberculosis     TOOK FULL COURSE OF INH    Tumor cells        Past Surgical History:   Procedure Laterality Date    HX DILATION AND CURETTAGE      FOR UTERINE HEMORRHAGE - spontaneous     HX GYN  2003    colposcopy - polyps    HX GYN      A1       Family History   Problem Relation Age of Onset    Hypertension Mother     High Cholesterol Mother     Diabetes Mother     Cancer Mother         basal cell skin ca    Heart Failure Father         rheumatoid heart disease    Heart Disease Father     Cancer Sister     Thyroid Disease Sister         hypothyroid       Social History     Socioeconomic History    Marital status:      Spouse name: Not on file    Number of children: Not on file    Years of education: Not on file    Highest education level: Not on file   Occupational History    Not on file   Tobacco Use    Smoking status: Former     Packs/day: 1.00     Types: Cigarettes     Quit date: 2007     Years since quitting: 15.6    Smokeless tobacco: Never   Vaping Use    Vaping Use: Never used   Substance and Sexual Activity    Alcohol use: Yes     Comment: SOCIAL    Drug use: No    Sexual activity: Never     Partners: Male   Other Topics Concern    Not on file   Social History Narrative    Not on file     Social Determinants of Health     Financial Resource Strain: Not on file   Food Insecurity: Not on file   Transportation Needs: Not on file   Physical Activity: Not on file   Stress: Not on file   Social Connections: Not on file   Intimate Partner Violence: Not on file   Housing Stability: Not on file       Orders Only on 10/18/2022   Component Date Value Ref Range Status    Hemoglobin A1c 10/18/2022 5.8 (A)  4.0 - 5.6 % Final    Comment: NEW METHOD  PLEASE NOTE NEW REFERENCE RANGE  (NOTE)  HbA1C Interpretive Ranges  <5.7              Normal  5.7 - 6.4         Consider Prediabetes  >6.5              Consider Diabetes      Est. average glucose 10/18/2022 120  mg/dL Final    Cholesterol, total 10/18/2022 208 (A)  <200 MG/DL Final    Triglyceride 10/18/2022 146  <150 MG/DL Final    Based on NCEP-ATP III:  Triglycerides <150 mg/dL  is considered normal, 150-199 mg/dL  borderline high,  200-499 mg/dL high and  greater than or equal to 500 mg/dL very high. HDL Cholesterol 10/18/2022 62  MG/DL Final    Based on NCEP ATP III, HDL Cholesterol <40 mg/dL is considered low and >60 mg/dL is elevated.     LDL, calculated 10/18/2022 116.8 (A)  0 - 100 MG/DL Final    Comment: Based on the NCEP-ATP: LDL-C concentrations are considered  optimal <100 mg/dL,  near optimal/above Normal 100-129 mg/dL  Borderline High: 130-159, High: 160-189 mg/dL  Very High: Greater than or equal to 190 mg/dL      VLDL, calculated 10/18/2022 29.2  MG/DL Final    CHOL/HDL Ratio 10/18/2022 3.4  0.0 - 5.0   Final    WBC 10/18/2022 6.2  3.6 - 11.0 K/uL Final    RBC 10/18/2022 4.77  3.80 - 5.20 M/uL Final    HGB 10/18/2022 14.4  11.5 - 16.0 g/dL Final    HCT 10/18/2022 44.8  35.0 - 47.0 % Final    MCV 10/18/2022 93.9  80.0 - 99.0 FL Final    MCH 10/18/2022 30.2  26.0 - 34.0 PG Final    MCHC 10/18/2022 32.1  30.0 - 36.5 g/dL Final    RDW 10/18/2022 12.6  11.5 - 14.5 % Final    PLATELET 30/80/8062 099  150 - 400 K/uL Final    MPV 10/18/2022 11.2  8.9 - 12.9 FL Final    NRBC 10/18/2022 0.0  0  WBC Final    ABSOLUTE NRBC 10/18/2022 0.00  0.00 - 0.01 K/uL Final    Sodium 10/18/2022 140  136 - 145 mmol/L Final    Potassium 10/18/2022 4.3  3.5 - 5.1 mmol/L Final    Chloride 10/18/2022 107  97 - 108 mmol/L Final    CO2 10/18/2022 29  21 - 32 mmol/L Final    Anion gap 10/18/2022 4 (A)  5 - 15 mmol/L Final    Glucose 10/18/2022 84  65 - 100 mg/dL Final    BUN 10/18/2022 13  6 - 20 MG/DL Final    Creatinine 10/18/2022 0.68  0.55 - 1.02 MG/DL Final    BUN/Creatinine ratio 10/18/2022 19  12 - 20   Final    eGFR 10/18/2022 >60  >60 ml/min/1.73m2 Final    Comment:   Pediatric calculator link: James[employer].at. org/professionals/kdoqi/gfr_calculatorped    Effective Oct 3, 2022    These results are not intended for use in patients <25years of age. eGFR results are calculated without a race factor using  the 2021 CKD-EPI equation. Careful clinical correlation is recommended, particularly when comparing to results calculated using previous equations. The CKD-EPI equation is less accurate in patients with extremes of muscle mass, extra-renal metabolism of creatinine, excessive creatine ingestion, or following therapy that affects renal tubular secretion. Calcium 10/18/2022 9.4  8.5 - 10.1 MG/DL Final    Bilirubin, total 10/18/2022 0.5  0.2 - 1.0 MG/DL Final    ALT (SGPT) 10/18/2022 24  12 - 78 U/L Final    AST (SGOT) 10/18/2022 18  15 - 37 U/L Final    Alk. phosphatase 10/18/2022 78  45 - 117 U/L Final    Protein, total 10/18/2022 7.3  6.4 - 8.2 g/dL Final    Albumin 10/18/2022 3.7  3.5 - 5.0 g/dL Final    Globulin 10/18/2022 3.6  2.0 - 4.0 g/dL Final    A-G Ratio 10/18/2022 1.0 (A)  1.1 - 2.2   Final   Office Visit on 10/18/2022   Component Date Value Ref Range Status    Occult blood fecal, by IA 10/19/2022 Negative  Negative Final       Review of Systems   Constitutional:  Negative for activity change, fatigue and unexpected weight change. HENT:  Negative for congestion, hearing loss, rhinorrhea and sore throat. Eyes:  Negative for discharge. Respiratory:  Negative for cough, chest tightness and shortness of breath. Cardiovascular:  Negative for leg swelling. Gastrointestinal:  Negative for abdominal pain, constipation and diarrhea. Genitourinary:  Negative for dysuria, flank pain, frequency and urgency. Musculoskeletal:  Positive for arthralgias (left thumb). Negative for back pain and myalgias.         Right groin pain   Skin:  Negative for color change and rash. Neurological:  Negative for dizziness, light-headedness and headaches. Psychiatric/Behavioral:  Negative for dysphoric mood and sleep disturbance. The patient is not nervous/anxious. Visit Vitals  /76 (BP 1 Location: Left upper arm)   Pulse 62   Temp 97.5 °F (36.4 °C)   Resp 17   Ht 5' (1.524 m)   Wt 139 lb 12.8 oz (63.4 kg)   LMP 03/09/2010   SpO2 95%   BMI 27.30 kg/m²       Physical Exam  Vitals and nursing note reviewed. Constitutional:       General: She is not in acute distress. Appearance: Normal appearance. She is well-developed. She is not diaphoretic. HENT:      Right Ear: External ear normal.      Left Ear: External ear normal.   Eyes:      General: No scleral icterus. Right eye: No discharge. Left eye: No discharge. Extraocular Movements: Extraocular movements intact. Conjunctiva/sclera: Conjunctivae normal.   Cardiovascular:      Rate and Rhythm: Normal rate and regular rhythm. Pulmonary:      Effort: Pulmonary effort is normal.      Breath sounds: Normal breath sounds. No wheezing. Abdominal:      General: Bowel sounds are normal.      Palpations: Abdomen is soft. Tenderness: There is no abdominal tenderness. Musculoskeletal:      Cervical back: Normal range of motion and neck supple. Lymphadenopathy:      Cervical: No cervical adenopathy. Neurological:      Mental Status: She is alert and oriented to person, place, and time. Psychiatric:         Mood and Affect: Mood and affect normal.         IBeverly MD, personally performed the services described in this documentation as scribed by Duyen Sanders in my presence, and it is both accurate and complete. An electronic signature was used to authenticate this note.   -- Duyen Sanders

## 2022-12-23 NOTE — PROGRESS NOTES
Chief Complaint   Patient presents with    Leg Pain    Groin Injury     2 weeks       Visit Vitals  /76 (BP 1 Location: Left upper arm)   Pulse 62   Temp 97.5 °F (36.4 °C)   Resp 17   Ht 5' (1.524 m)   Wt 139 lb 12.8 oz (63.4 kg)   LMP 03/09/2010   SpO2 95%   BMI 27.30 kg/m²       1. Have you been to the ER, urgent care clinic since your last visit? Hospitalized since your last visit? No    2. Have you seen or consulted any other health care providers outside of the [address] [place] [address] since your last visit? Include any pap smears or colon screening.  No

## 2022-12-25 DIAGNOSIS — R10.13 EPIGASTRIC PAIN: ICD-10-CM

## 2022-12-29 ENCOUNTER — HOSPITAL ENCOUNTER (OUTPATIENT)
Dept: ULTRASOUND IMAGING | Age: 67
Discharge: HOME | End: 2022-12-29
Attending: INTERNAL MEDICINE
Payer: MEDICARE

## 2022-12-29 DIAGNOSIS — R10.31 RIGHT GROIN PAIN: ICD-10-CM

## 2022-12-29 PROCEDURE — 76882 US LMTD JT/FCL EVL NVASC XTR: CPT

## 2022-12-29 RX ORDER — SUCRALFATE 1 G/1
1 TABLET ORAL 3 TIMES DAILY
Qty: 90 TABLET | Refills: 0 | Status: SHIPPED | OUTPATIENT
Start: 2022-12-29 | End: 2023-01-28

## 2023-01-02 NOTE — PROGRESS NOTES
Let her know Groin ultrasound came back fine. No hernia. Ask her if she is having any pain or symptoms?

## 2023-01-03 NOTE — PROGRESS NOTES
Spoke to pt again and she said she does take tylenol it helps but doesn't help for long. She said she has been out of work for 3 weeks and goes back Friday. She then said she hurt her back at work lifting something. I asked her when she hurt her back at work and she said, \"oh, shit. \" And then said usually when that happens by the next day she is fine but not this time, she wonders if the pain from her back is now gone to her groin area. She asked me and I said possibly but I don't know. I told her I would let Dr. Antonio Braxton know and she said she will see how work goes Friday and I told her to let us know how it goes and she said thank you.

## 2023-01-03 NOTE — PROGRESS NOTES
Spoke to pt and told her, her results. She is still in pain but not as bad. She asked if its an infection, I said I'd have to send a message to Dr. Dominic Richards and ask her. She said ok. She said when he stands up for a while she has to go sit down. Maybe she pulled something she stated.

## 2023-03-02 ENCOUNTER — HOSPITAL ENCOUNTER (OUTPATIENT)
Dept: MAMMOGRAPHY | Age: 68
Discharge: HOME | End: 2023-03-02
Attending: INTERNAL MEDICINE
Payer: MEDICARE

## 2023-03-02 ENCOUNTER — TRANSCRIBE ORDER (OUTPATIENT)
Dept: GENERAL RADIOLOGY | Age: 68
End: 2023-03-02

## 2023-03-02 DIAGNOSIS — R10.9 STOMACH ACHE: Primary | ICD-10-CM

## 2023-03-02 DIAGNOSIS — R19.7 DIARRHEA: ICD-10-CM

## 2023-03-02 DIAGNOSIS — Z12.31 ENCOUNTER FOR SCREENING MAMMOGRAM FOR MALIGNANT NEOPLASM OF BREAST: ICD-10-CM

## 2023-03-02 PROCEDURE — 77063 BREAST TOMOSYNTHESIS BI: CPT

## 2023-03-09 ENCOUNTER — OFFICE VISIT (OUTPATIENT)
Dept: URGENT CARE | Age: 68
End: 2023-03-09
Payer: MEDICARE

## 2023-03-09 VITALS
TEMPERATURE: 99.6 F | HEIGHT: 61 IN | HEART RATE: 82 BPM | SYSTOLIC BLOOD PRESSURE: 132 MMHG | BODY MASS INDEX: 26.41 KG/M2 | WEIGHT: 139.9 LBS | OXYGEN SATURATION: 97 % | DIASTOLIC BLOOD PRESSURE: 84 MMHG

## 2023-03-09 DIAGNOSIS — R31.9 HEMATURIA, UNSPECIFIED TYPE: ICD-10-CM

## 2023-03-09 DIAGNOSIS — R80.9 PROTEINURIA, UNSPECIFIED TYPE: ICD-10-CM

## 2023-03-09 DIAGNOSIS — A09 INFECTIOUS DIARRHEA: ICD-10-CM

## 2023-03-09 DIAGNOSIS — R30.0 DYSURIA: Primary | ICD-10-CM

## 2023-03-09 LAB
BILIRUB UR QL STRIP: NEGATIVE
GLUCOSE UR-MCNC: NEGATIVE MG/DL
KETONES P FAST UR STRIP-MCNC: NEGATIVE MG/DL
PH UR STRIP: 5.5 [PH] (ref 4.6–8)
PROT UR QL STRIP: ABNORMAL
SP GR UR STRIP: 1.02 (ref 1–1.03)
UA UROBILINOGEN AMB POC: ABNORMAL (ref 0.2–1)
URINALYSIS CLARITY POC: ABNORMAL
URINALYSIS COLOR POC: ABNORMAL
URINE BLOOD POC: ABNORMAL
URINE LEUKOCYTES POC: NEGATIVE
URINE NITRITES POC: NEGATIVE

## 2023-03-09 PROCEDURE — G9899 SCRN MAM PERF RSLTS DOC: HCPCS | Performed by: NURSE PRACTITIONER

## 2023-03-09 PROCEDURE — G8427 DOCREV CUR MEDS BY ELIG CLIN: HCPCS | Performed by: NURSE PRACTITIONER

## 2023-03-09 PROCEDURE — G8432 DEP SCR NOT DOC, RNG: HCPCS | Performed by: NURSE PRACTITIONER

## 2023-03-09 PROCEDURE — G8417 CALC BMI ABV UP PARAM F/U: HCPCS | Performed by: NURSE PRACTITIONER

## 2023-03-09 PROCEDURE — 1090F PRES/ABSN URINE INCON ASSESS: CPT | Performed by: NURSE PRACTITIONER

## 2023-03-09 PROCEDURE — 3017F COLORECTAL CA SCREEN DOC REV: CPT | Performed by: NURSE PRACTITIONER

## 2023-03-09 PROCEDURE — 1101F PT FALLS ASSESS-DOCD LE1/YR: CPT | Performed by: NURSE PRACTITIONER

## 2023-03-09 PROCEDURE — 81003 URINALYSIS AUTO W/O SCOPE: CPT | Performed by: NURSE PRACTITIONER

## 2023-03-09 PROCEDURE — G8399 PT W/DXA RESULTS DOCUMENT: HCPCS | Performed by: NURSE PRACTITIONER

## 2023-03-09 PROCEDURE — 1123F ACP DISCUSS/DSCN MKR DOCD: CPT | Performed by: NURSE PRACTITIONER

## 2023-03-09 PROCEDURE — G8536 NO DOC ELDER MAL SCRN: HCPCS | Performed by: NURSE PRACTITIONER

## 2023-03-09 PROCEDURE — 99203 OFFICE O/P NEW LOW 30 MIN: CPT | Performed by: NURSE PRACTITIONER

## 2023-03-09 RX ORDER — CIPROFLOXACIN 500 MG/1
500 TABLET ORAL 2 TIMES DAILY
Qty: 10 TABLET | Refills: 0 | Status: SHIPPED | OUTPATIENT
Start: 2023-03-09 | End: 2023-03-14

## 2023-03-09 NOTE — LETTER
NOTIFICATION RETURN TO WORK / SCHOOL    3/9/2023 4:40 PM    Ms. Alexander Fofana  [place] [address] [place] [id number]      To Whom It May Concern:    Alexander Fofana is currently under the care of [facility]. Please excuse from work. She will return to work/school on: 03/11/2023    If there are questions or concerns please have the patient contact our office.         Sincerely,      GHE PROVIDER

## 2023-03-09 NOTE — PATIENT INSTRUCTIONS
Any new, worsening or changes, you need to go immediately to the emergency department. See your PCP immediately if symptoms have not improved over the next 4 days.

## 2023-03-09 NOTE — PROGRESS NOTES
Here for UTI  Burning with urination onset 3 days ago  Associated urinary urgency and frequency and bladder pressure. She does mention she had single bout of loose non bloody stool with some noted mucus but otherwise has not had any abdominal pain, vomiting or additional episodes since this morning. Thinks it was from some sushi that she ate the day prior. She does have hx of diverticulitis; states this does NOT feel anything like that. 0/10 abdominal pain in office today. Urinary symptoms still remain. GI symptoms resolved.         Past Medical History:   Diagnosis Date    Cervical polyp     no HPV    Genital herpes     in past,no flare up    Tuberculosis     TOOK FULL COURSE OF INH    Tumor cells         Past Surgical History:   Procedure Laterality Date    HX DILATION AND CURETTAGE      FOR UTERINE HEMORRHAGE - spontaneous     HX GYN  2003    colposcopy - polyps    HX GYN      A1         Family History   Problem Relation Age of Onset    Hypertension Mother     High Cholesterol Mother     Diabetes Mother     Cancer Mother         basal cell skin ca    Heart Failure Father         rheumatoid heart disease    Heart Disease Father     Cancer Sister     Thyroid Disease Sister         hypothyroid        Social History     Socioeconomic History    Marital status:      Spouse name: Not on file    Number of children: Not on file    Years of education: Not on file    Highest education level: Not on file   Occupational History    Not on file   Tobacco Use    Smoking status: Former     Packs/day: 1.00     Types: Cigarettes     Quit date: 2007     Years since quitting: 15.8    Smokeless tobacco: Never   Vaping Use    Vaping Use: Never used   Substance and Sexual Activity    Alcohol use: Yes     Comment: SOCIAL    Drug use: No    Sexual activity: Never     Partners: Male   Other Topics Concern    Not on file   Social History Narrative    Not on file     Social Determinants of Health Financial Resource Strain: Not on file   Food Insecurity: Not on file   Transportation Needs: Not on file   Physical Activity: Not on file   Stress: Not on file   Social Connections: Not on file   Intimate Partner Violence: Not on file   Housing Stability: Not on file                ALLERGIES: Percocet [oxycodone-acetaminophen], Aspirin, and Penicillins    Review of Systems   All other systems reviewed and are negative. Vitals:    03/09/23 1548   BP: 132/84   Pulse: 82   Temp: 99.6 °F (37.6 °C)   SpO2: 97%   Weight: 139 lb 14.4 oz (63.5 kg)   Height: 5' 1\" (1.549 m)       Physical Exam  Vitals reviewed. Constitutional:       General: She is not in acute distress. Appearance: She is not ill-appearing, toxic-appearing or diaphoretic. HENT:      Head: Normocephalic and atraumatic. Eyes:      Extraocular Movements: Extraocular movements intact. Cardiovascular:      Rate and Rhythm: Normal rate and regular rhythm. Pulses: Normal pulses. Heart sounds: Normal heart sounds. No murmur heard. No friction rub. No gallop. Pulmonary:      Effort: Pulmonary effort is normal. No respiratory distress. Breath sounds: Normal breath sounds. No wheezing or rales. Abdominal:      General: Abdomen is flat. Bowel sounds are normal. There is no distension. Palpations: Abdomen is soft. There is no hepatomegaly, splenomegaly or mass. Tenderness: There is no abdominal tenderness. There is no right CVA tenderness, left CVA tenderness or guarding. Negative signs include Loco's sign and McBurney's sign. Comments: All quadrants soft non tender. No guarding   Skin:     Coloration: Skin is not pale. Findings: No rash. Neurological:      Mental Status: She is alert and oriented to person, place, and time. Psychiatric:         Mood and Affect: Mood normal.         Behavior: Behavior normal.         Thought Content:  Thought content normal.       MDM     Differential Diagnosis; Clinical Impression; Plan:           Procedures      1. Dysuria    - AMB POC URINALYSIS DIP STICK AUTO W/O MICRO  - ciprofloxacin HCl (CIPRO) 500 mg tablet; Take 1 Tablet by mouth two (2) times a day for 5 days. Dispense: 10 Tablet; Refill: 0    UA trace protein and 2+ blood. No JULIANA or NIT suggesting UTI  Spoke with patient advised UA repeat with PCP to see if resolved within 1-2 weeks. Maintain adequate fluid intake    2. Infectious diarrhea    - ciprofloxacin HCl (CIPRO) 500 mg tablet; Take 1 Tablet by mouth two (2) times a day for 5 days. Dispense: 10 Tablet; Refill: 0    Given mucus in stool with diarrhea, will treat with cipro for possible infectious diarrhea. On exam abdomen is soft and non tender. Patient is taking PO food and fluids normally. 3. Proteinuria, unspecified type      4. Hematuria, unspecified type      Follow up:  Repeat UA in 1-2 weeks with PCP  Advised immediately ED evaluation for new, worsening or changes patient verbalized she would do so and monitor her symptoms closely.         Results for orders placed or performed in visit on 03/09/23   AMB POC URINALYSIS DIP STICK AUTO W/O MICRO   Result Value Ref Range    Color (UA POC) Judi     Clarity (UA POC) Turbid     Glucose (UA POC) Negative Negative    Bilirubin (UA POC) Negative Negative    Ketones (UA POC) Negative Negative    Specific gravity (UA POC) 1.025 1.001 - 1.035    Blood (UA POC) 2+ Negative    pH (UA POC) 5.5 4.6 - 8.0    Protein (UA POC) Trace Negative    Urobilinogen (UA POC) 0.2 mg/dL 0.2 - 1    Nitrites (UA POC) Negative Negative    Leukocyte esterase (UA POC) Negative Negative

## 2023-04-22 DIAGNOSIS — R10.9 STOMACH ACHE: Primary | ICD-10-CM

## 2023-06-23 ENCOUNTER — OFFICE VISIT (OUTPATIENT)
Age: 68
End: 2023-06-23

## 2023-06-23 VITALS
BODY MASS INDEX: 27 KG/M2 | DIASTOLIC BLOOD PRESSURE: 65 MMHG | HEIGHT: 61 IN | TEMPERATURE: 97.8 F | HEART RATE: 64 BPM | WEIGHT: 143 LBS | RESPIRATION RATE: 16 BRPM | SYSTOLIC BLOOD PRESSURE: 116 MMHG | OXYGEN SATURATION: 96 %

## 2023-06-23 DIAGNOSIS — T63.441A BEE STING REACTION, ACCIDENTAL OR UNINTENTIONAL, INITIAL ENCOUNTER: Primary | ICD-10-CM

## 2023-06-24 ASSESSMENT — ENCOUNTER SYMPTOMS: SHORTNESS OF BREATH: 0

## 2023-06-24 NOTE — PROGRESS NOTES
Chief Complaint   Patient presents with    Insect Bite     Pt here today with complaint off bee sting to her left forearm. Sting occurred 8-9 days ago and she reports ongoing redness, swelling. Noticed some white discharge from the area. Skin Problem     Pt reports a sore, red spot inside the left nostril. Thinks she may have irritated it when cleaning with a q-tip. Present for several weeks now. Insect Bite  Location:  Left arm/ forearm  Severity:  Mild  Timing:  Constant  Progression:  Improving  Context:  Had bee sting on left forearm  Relieved by:  Benadryl  Associated symptoms: no fever, no myalgias, no rash and no shortness of breath    Associated symptoms comment:  Developed local swelling and redness  Over past 8-9 days both improving - no pain       Past Medical History:   Diagnosis Date    Cervical polyp     no HPV    Genital herpes     in past,no flare up    Tuberculosis     TOOK FULL COURSE OF INH    Tumor cells        Past Surgical History:   Procedure Laterality Date    DILATION AND CURETTAGE OF UTERUS      FOR UTERINE HEMORRHAGE - spontaneous     GYN      A1    GYN  2003    colposcopy - polyps       Social History     Tobacco Use    Smoking status: Former     Packs/day: 1.00     Types: Cigarettes     Quit date: 2007     Years since quittin.1    Smokeless tobacco: Never   Substance Use Topics    Alcohol use: Yes    Drug use: No        Allergies   Allergen Reactions    Oxycodone-Acetaminophen Rash     After being on it for a month    Aspirin Hives     But can take advil    Penicillins Rash        Review of Systems   Constitutional:  Negative for fever. HENT:          Small area on redness on left nostril entrance    Respiratory:  Negative for shortness of breath. Musculoskeletal:  Negative for myalgias. Skin:  Positive for pallor. Negative for rash. All other systems reviewed and are negative.      /65   Pulse 64   Temp 97.8 °F (36.6 °C) Breath sounds clear and equal bilaterally.

## 2024-01-08 ENCOUNTER — OFFICE VISIT (OUTPATIENT)
Dept: PRIMARY CARE CLINIC | Facility: CLINIC | Age: 69
End: 2024-01-08
Payer: MEDICARE

## 2024-01-08 ENCOUNTER — HOSPITAL ENCOUNTER (OUTPATIENT)
Facility: HOSPITAL | Age: 69
Discharge: HOME | End: 2024-01-11
Attending: INTERNAL MEDICINE
Payer: MEDICARE

## 2024-01-08 VITALS
OXYGEN SATURATION: 96 % | HEART RATE: 74 BPM | WEIGHT: 150.2 LBS | RESPIRATION RATE: 17 BRPM | TEMPERATURE: 97.3 F | HEIGHT: 60 IN | SYSTOLIC BLOOD PRESSURE: 139 MMHG | BODY MASS INDEX: 29.49 KG/M2 | DIASTOLIC BLOOD PRESSURE: 85 MMHG

## 2024-01-08 DIAGNOSIS — Z00.00 MEDICARE ANNUAL WELLNESS VISIT, SUBSEQUENT: Primary | ICD-10-CM

## 2024-01-08 DIAGNOSIS — K21.9 GASTROESOPHAGEAL REFLUX DISEASE WITHOUT ESOPHAGITIS: ICD-10-CM

## 2024-01-08 DIAGNOSIS — M79.671 FOOT PAIN, RIGHT: ICD-10-CM

## 2024-01-08 DIAGNOSIS — E78.2 MIXED HYPERLIPIDEMIA: ICD-10-CM

## 2024-01-08 DIAGNOSIS — R73.02 IGT (IMPAIRED GLUCOSE TOLERANCE): ICD-10-CM

## 2024-01-08 DIAGNOSIS — Z71.89 ACP (ADVANCE CARE PLANNING): ICD-10-CM

## 2024-01-08 DIAGNOSIS — J34.89 NOSE PAIN: ICD-10-CM

## 2024-01-08 DIAGNOSIS — M85.89 OSTEOPENIA OF MULTIPLE SITES: ICD-10-CM

## 2024-01-08 DIAGNOSIS — Z12.11 COLON CANCER SCREENING: ICD-10-CM

## 2024-01-08 PROCEDURE — 1036F TOBACCO NON-USER: CPT | Performed by: INTERNAL MEDICINE

## 2024-01-08 PROCEDURE — 3017F COLORECTAL CA SCREEN DOC REV: CPT | Performed by: INTERNAL MEDICINE

## 2024-01-08 PROCEDURE — 1123F ACP DISCUSS/DSCN MKR DOCD: CPT | Performed by: INTERNAL MEDICINE

## 2024-01-08 PROCEDURE — G8427 DOCREV CUR MEDS BY ELIG CLIN: HCPCS | Performed by: INTERNAL MEDICINE

## 2024-01-08 PROCEDURE — 1090F PRES/ABSN URINE INCON ASSESS: CPT | Performed by: INTERNAL MEDICINE

## 2024-01-08 PROCEDURE — G8399 PT W/DXA RESULTS DOCUMENT: HCPCS | Performed by: INTERNAL MEDICINE

## 2024-01-08 PROCEDURE — 73630 X-RAY EXAM OF FOOT: CPT

## 2024-01-08 PROCEDURE — G0439 PPPS, SUBSEQ VISIT: HCPCS | Performed by: INTERNAL MEDICINE

## 2024-01-08 PROCEDURE — G8484 FLU IMMUNIZE NO ADMIN: HCPCS | Performed by: INTERNAL MEDICINE

## 2024-01-08 PROCEDURE — G8419 CALC BMI OUT NRM PARAM NOF/U: HCPCS | Performed by: INTERNAL MEDICINE

## 2024-01-08 PROCEDURE — 99214 OFFICE O/P EST MOD 30 MIN: CPT | Performed by: INTERNAL MEDICINE

## 2024-01-08 RX ORDER — OMEPRAZOLE 40 MG/1
40 CAPSULE, DELAYED RELEASE ORAL DAILY
Qty: 30 CAPSULE | Refills: 1 | Status: SHIPPED | OUTPATIENT
Start: 2024-01-08 | End: 2024-04-07

## 2024-01-08 SDOH — ECONOMIC STABILITY: FOOD INSECURITY: WITHIN THE PAST 12 MONTHS, YOU WORRIED THAT YOUR FOOD WOULD RUN OUT BEFORE YOU GOT MONEY TO BUY MORE.: PATIENT DECLINED

## 2024-01-08 SDOH — ECONOMIC STABILITY: INCOME INSECURITY: HOW HARD IS IT FOR YOU TO PAY FOR THE VERY BASICS LIKE FOOD, HOUSING, MEDICAL CARE, AND HEATING?: PATIENT DECLINED

## 2024-01-08 SDOH — ECONOMIC STABILITY: HOUSING INSECURITY
IN THE LAST 12 MONTHS, WAS THERE A TIME WHEN YOU DID NOT HAVE A STEADY PLACE TO SLEEP OR SLEPT IN A SHELTER (INCLUDING NOW)?: PATIENT DECLINED

## 2024-01-08 SDOH — ECONOMIC STABILITY: FOOD INSECURITY: WITHIN THE PAST 12 MONTHS, THE FOOD YOU BOUGHT JUST DIDN'T LAST AND YOU DIDN'T HAVE MONEY TO GET MORE.: PATIENT DECLINED

## 2024-01-08 ASSESSMENT — LIFESTYLE VARIABLES
HOW MANY STANDARD DRINKS CONTAINING ALCOHOL DO YOU HAVE ON A TYPICAL DAY: PATIENT DOES NOT DRINK
HOW OFTEN DO YOU HAVE A DRINK CONTAINING ALCOHOL: NEVER

## 2024-01-08 ASSESSMENT — ENCOUNTER SYMPTOMS
EYE DISCHARGE: 0
CHEST TIGHTNESS: 0
SHORTNESS OF BREATH: 0
ABDOMINAL PAIN: 0
RHINORRHEA: 0
SORE THROAT: 0
BACK PAIN: 0
DIARRHEA: 0
COUGH: 0
COLOR CHANGE: 0
CONSTIPATION: 0

## 2024-01-08 ASSESSMENT — PATIENT HEALTH QUESTIONNAIRE - PHQ9
SUM OF ALL RESPONSES TO PHQ QUESTIONS 1-9: 0
SUM OF ALL RESPONSES TO PHQ QUESTIONS 1-9: 0
2. FEELING DOWN, DEPRESSED OR HOPELESS: 0
1. LITTLE INTEREST OR PLEASURE IN DOING THINGS: 0
SUM OF ALL RESPONSES TO PHQ QUESTIONS 1-9: 0
SUM OF ALL RESPONSES TO PHQ9 QUESTIONS 1 & 2: 0
SUM OF ALL RESPONSES TO PHQ QUESTIONS 1-9: 0

## 2024-01-08 NOTE — PROGRESS NOTES
Health Decision Maker has been checked with the patient   Primary Decision Maker: Kassandra Mcintosh - Other - [phone]   Patient refused flu shot in office  Patient has stated that the scribe can come in room    Chief Complaint   Patient presents with    Medicare AWV     Knot on foot Right foot     Depression: Not at risk (1/8/2024)    PHQ-2     PHQ-2 Score: 0      /85 (Site: Left Upper Arm)   Pulse 74   Temp 97.3 °F (36.3 °C)   Resp 17   Ht 1.524 m (5')   Wt 68.1 kg (150 lb 3.2 oz)   SpO2 96%   BMI 29.33 kg/m²     \"Have you been to the ER, urgent care clinic since your last visit?  Hospitalized since your last visit?\"    NO    “Have you seen or consulted any other health care providers outside of [facility] since your last visit?”    NO           
  Height: 1.524 m (5')      Body mass index is 29.33 kg/m².             Allergies   Allergen Reactions    Oxycodone-Acetaminophen Rash     After being on it for a month    Aspirin Hives     But can take advil    Penicillins Rash     Prior to Visit Medications    Medication Sig Taking? Authorizing Provider   omeprazole (PRILOSEC) 40 MG delayed release capsule Take 1 capsule by mouth daily Yes Danii Mathur MD       CareTeam (Including outside providers/suppliers regularly involved in providing care):   Patient Care Team:  Danii Mathur MD as PCP - General  Danii Mathur MD as PCP - Empaneled Provider     Reviewed and updated this visit:  Tobacco  Allergies  Meds  Problems  Med Hx  Surg Hx  Soc Hx  Fam Hx       Reviewed and updated this visit:  Tobacco  Allergies  Meds  Problems  Med Hx  Surg Hx  Soc Hx  Fam Hx      Health screenings:   Eye exam up to date   Mammogram up to date   DEXA scan up to date , new order placed   Colon cancer screening up to date , Referral placed.   PAP smear followed by Gyn ,does not want pap smear anymore  COVID vaccine does not want booster anymore.   Pneumonia vaccine  declined.  Tdap up to date , got because of the injury at work.   Influenza  declined   Shingles vaccine  declined   Activity level recommend daily walk  Incontinence None   Controlled substance/Opioids none     All other Health screenings done under rooming encounter      Does not want any vaccine. Consequences discussed. She verbalized understanding.   
foot)  Lymphadenopathy:      Cervical: No cervical adenopathy.   Neurological:      Mental Status: She is alert and oriented to person, place, and time.   Psychiatric:         Mood and Affect: Mood normal.                An electronic signature was used to authenticate this note.    --Lisa Connelly

## 2024-01-09 DIAGNOSIS — E78.2 MIXED HYPERLIPIDEMIA: ICD-10-CM

## 2024-01-09 DIAGNOSIS — R73.02 IGT (IMPAIRED GLUCOSE TOLERANCE): ICD-10-CM

## 2024-01-09 LAB
ALBUMIN SERPL-MCNC: 3.5 G/DL (ref 3.5–5)
ALBUMIN/GLOB SERPL: 0.9 (ref 1.1–2.2)
ALP SERPL-CCNC: 89 U/L (ref 45–117)
ALT SERPL-CCNC: 29 U/L (ref 12–78)
ANION GAP SERPL CALC-SCNC: 5 MMOL/L (ref 5–15)
AST SERPL-CCNC: 17 U/L (ref 15–37)
BILIRUB SERPL-MCNC: 0.5 MG/DL (ref 0.2–1)
BUN SERPL-MCNC: 13 MG/DL (ref 6–20)
BUN/CREAT SERPL: 17 (ref 12–20)
CALCIUM SERPL-MCNC: 8.8 MG/DL (ref 8.5–10.1)
CHLORIDE SERPL-SCNC: 107 MMOL/L (ref 97–108)
CHOLEST SERPL-MCNC: 220 MG/DL
CO2 SERPL-SCNC: 30 MMOL/L (ref 21–32)
CREAT SERPL-MCNC: 0.77 MG/DL (ref 0.55–1.02)
ERYTHROCYTE [DISTWIDTH] IN BLOOD BY AUTOMATED COUNT: 12.6 % (ref 11.5–14.5)
EST. AVERAGE GLUCOSE BLD GHB EST-MCNC: 117 MG/DL
GLOBULIN SER CALC-MCNC: 3.7 G/DL (ref 2–4)
GLUCOSE SERPL-MCNC: 105 MG/DL (ref 65–100)
HBA1C MFR BLD: 5.7 % (ref 4–5.6)
HCT VFR BLD AUTO: 44.9 % (ref 35–47)
HDLC SERPL-MCNC: 56 MG/DL
HDLC SERPL: 3.9 (ref 0–5)
HGB BLD-MCNC: 14.7 G/DL (ref 11.5–16)
LDLC SERPL CALC-MCNC: 134.2 MG/DL (ref 0–100)
MCH RBC QN AUTO: 30 PG (ref 26–34)
MCHC RBC AUTO-ENTMCNC: 32.7 G/DL (ref 30–36.5)
MCV RBC AUTO: 91.6 FL (ref 80–99)
NRBC # BLD: 0 K/UL (ref 0–0.01)
NRBC BLD-RTO: 0 PER 100 WBC
PLATELET # BLD AUTO: 223 K/UL (ref 150–400)
PMV BLD AUTO: 11.5 FL (ref 8.9–12.9)
POTASSIUM SERPL-SCNC: 4.4 MMOL/L (ref 3.5–5.1)
PROT SERPL-MCNC: 7.2 G/DL (ref 6.4–8.2)
RBC # BLD AUTO: 4.9 M/UL (ref 3.8–5.2)
SODIUM SERPL-SCNC: 142 MMOL/L (ref 136–145)
TRIGL SERPL-MCNC: 149 MG/DL
VLDLC SERPL CALC-MCNC: 29.8 MG/DL
WBC # BLD AUTO: 5.5 K/UL (ref 3.6–11)

## 2024-01-11 ENCOUNTER — TELEPHONE (OUTPATIENT)
Dept: PRIMARY CARE CLINIC | Facility: CLINIC | Age: 69
End: 2024-01-11

## 2024-01-11 DIAGNOSIS — M79.673 PAIN OF FOOT, UNSPECIFIED LATERALITY: Primary | ICD-10-CM

## 2024-01-11 NOTE — TELEPHONE ENCOUNTER
Patient calling to get referral for a foot doctor. Said she spoke with Daksha this morning. Please call patient with information.

## 2024-01-15 ENCOUNTER — HOSPITAL ENCOUNTER (OUTPATIENT)
Facility: HOSPITAL | Age: 69
Discharge: HOME | End: 2024-01-18
Attending: INTERNAL MEDICINE
Payer: MEDICARE

## 2024-01-15 DIAGNOSIS — M85.89 OSTEOPENIA OF MULTIPLE SITES: ICD-10-CM

## 2024-01-15 PROCEDURE — 77080 DXA BONE DENSITY AXIAL: CPT

## 2024-01-31 DIAGNOSIS — K21.9 GASTROESOPHAGEAL REFLUX DISEASE WITHOUT ESOPHAGITIS: ICD-10-CM

## 2024-01-31 RX ORDER — OMEPRAZOLE 40 MG/1
40 CAPSULE, DELAYED RELEASE ORAL DAILY
Qty: 90 CAPSULE | Refills: 0 | Status: SHIPPED | OUTPATIENT
Start: 2024-01-31 | End: 2024-04-30

## 2024-04-29 DIAGNOSIS — K21.9 GASTROESOPHAGEAL REFLUX DISEASE WITHOUT ESOPHAGITIS: ICD-10-CM

## 2024-04-29 RX ORDER — OMEPRAZOLE 40 MG/1
CAPSULE, DELAYED RELEASE ORAL DAILY
Qty: 90 CAPSULE | Refills: 0 | Status: SHIPPED | OUTPATIENT
Start: 2024-04-29

## 2024-06-29 ENCOUNTER — OFFICE VISIT (OUTPATIENT)
Age: 69
End: 2024-06-29

## 2024-06-29 VITALS
BODY MASS INDEX: 27.56 KG/M2 | DIASTOLIC BLOOD PRESSURE: 85 MMHG | TEMPERATURE: 98.8 F | HEART RATE: 85 BPM | WEIGHT: 146 LBS | SYSTOLIC BLOOD PRESSURE: 137 MMHG | HEIGHT: 61 IN | RESPIRATION RATE: 18 BRPM | OXYGEN SATURATION: 93 %

## 2024-06-29 DIAGNOSIS — N30.01 ACUTE CYSTITIS WITH HEMATURIA: Primary | ICD-10-CM

## 2024-06-29 LAB
BILIRUBIN, URINE, POC: NEGATIVE
BLOOD URINE, POC: ABNORMAL
GLUCOSE URINE, POC: NEGATIVE
KETONES, URINE, POC: NEGATIVE
LEUKOCYTE ESTERASE, URINE, POC: NEGATIVE
NITRITE, URINE, POC: NEGATIVE
PH, URINE, POC: 5.5 (ref 4.6–8)
PROTEIN,URINE, POC: NEGATIVE
SPECIFIC GRAVITY, URINE, POC: 1.02 (ref 1–1.03)
URINALYSIS CLARITY, POC: CLEAR
URINALYSIS COLOR, POC: YELLOW
UROBILINOGEN, POC: ABNORMAL

## 2024-06-29 RX ORDER — PHENAZOPYRIDINE HYDROCHLORIDE 200 MG/1
200 TABLET, FILM COATED ORAL 3 TIMES DAILY PRN
Qty: 9 TABLET | Refills: 0 | Status: SHIPPED | OUTPATIENT
Start: 2024-06-29 | End: 2024-07-02

## 2024-06-29 RX ORDER — NITROFURANTOIN 25; 75 MG/1; MG/1
100 CAPSULE ORAL 2 TIMES DAILY
Qty: 14 CAPSULE | Refills: 0 | Status: SHIPPED | OUTPATIENT
Start: 2024-06-29 | End: 2024-07-06

## 2024-06-29 ASSESSMENT — ENCOUNTER SYMPTOMS
NAUSEA: 0
VOMITING: 0

## 2024-06-29 NOTE — PROGRESS NOTES
Subjective     Chief Complaint   Patient presents with    Urinary Tract Infection     Chills, fatigue, abdominal, burning during urination since yesterday.          Urinary Tract Infection     69-year-old female who presents with dysuria frequency and urgency chills started yesterday no fever nausea vomiting.  No specific treatment    Past Medical History:   Diagnosis Date    Cervical polyp     no HPV    Genital herpes     in past,no flare up    Tuberculosis     TOOK FULL COURSE OF INH    Tumor cells        Past Surgical History:   Procedure Laterality Date    DILATION AND CURETTAGE OF UTERUS      FOR UTERINE HEMORRHAGE - spontaneous     GYN      A1    GYN  2003    colposcopy - polyps       Family History   Problem Relation Age of Onset    Heart Failure Father         rheumatoid heart disease    Heart Disease Father     Cancer Sister     Thyroid Disease Sister         hypothyroid    Hypertension Mother     High Cholesterol Mother     Diabetes Mother     Cancer Mother         basal cell skin ca       Allergies   Allergen Reactions    Oxycodone-Acetaminophen Rash     After being on it for a month    Aspirin Hives     But can take advil    Penicillins Rash       Social History     Tobacco Use    Smoking status: Former     Current packs/day: 0.00     Types: Cigarettes     Quit date: 2007     Years since quittin.1    Smokeless tobacco: Never   Substance Use Topics    Alcohol use: Yes    Drug use: No       Vitals:    24 1610   BP: 133/80   Pulse: 85   Resp: 18   Temp: 98.8 °F (37.1 °C)   SpO2: 93%       Review of Systems   Constitutional:  Positive for chills. Negative for fever.   Gastrointestinal:  Negative for nausea and vomiting.   Genitourinary:  Positive for dysuria, frequency and urgency.       Objective     Physical Exam  Vitals reviewed.   Constitutional:       Appearance: Normal appearance.   Cardiovascular:      Rate and Rhythm: Normal rate and regular rhythm.      Heart

## 2024-06-29 NOTE — PATIENT INSTRUCTIONS
Thank you for visiting [facility] today.    -Increase fluid intake.  Some people say cranberry juice helps with discomfort.  -Don't hold your urine.  Urinate when you feel like you need to.  -Wipe from front to back after bowel movements.  -If sexually active, urinate after intercourse and use enough lubrication.   -Avoid taking bubble baths.  -Wear loose fitting clothing.  -Decrease caffeine or carbonated drinks  -Repeat urine screen in two weeks  -Take antibiotic with food and consider probiotic    Follow up with your PCP if symptoms persist or worsen.    Please go to the Emergency Department immediately for symptoms of a urinary tract infection along with any of the following:  fever with severe and sudden shaking (rigors), nausea, vomiting and the inability to keep down clear fluids or medications.

## 2024-07-29 DIAGNOSIS — K21.9 GASTROESOPHAGEAL REFLUX DISEASE WITHOUT ESOPHAGITIS: ICD-10-CM

## 2024-07-29 RX ORDER — OMEPRAZOLE 40 MG/1
CAPSULE, DELAYED RELEASE ORAL DAILY
Qty: 90 CAPSULE | Refills: 0 | Status: SHIPPED | OUTPATIENT
Start: 2024-07-29

## 2024-07-30 ENCOUNTER — ANESTHESIA EVENT (OUTPATIENT)
Facility: HOSPITAL | Age: 69
End: 2024-07-30
Payer: MEDICARE

## 2024-07-30 ENCOUNTER — ANESTHESIA (OUTPATIENT)
Facility: HOSPITAL | Age: 69
End: 2024-07-30
Payer: MEDICARE

## 2024-07-30 ENCOUNTER — HOSPITAL ENCOUNTER (OUTPATIENT)
Facility: HOSPITAL | Age: 69
Setting detail: OUTPATIENT SURGERY
Discharge: HOME | End: 2024-07-30
Attending: SPECIALIST | Admitting: SPECIALIST
Payer: MEDICARE

## 2024-07-30 VITALS
HEART RATE: 75 BPM | SYSTOLIC BLOOD PRESSURE: 99 MMHG | HEIGHT: 60 IN | BODY MASS INDEX: 28.58 KG/M2 | DIASTOLIC BLOOD PRESSURE: 60 MMHG | RESPIRATION RATE: 17 BRPM | TEMPERATURE: 97.5 F | OXYGEN SATURATION: 97 % | WEIGHT: 145.6 LBS

## 2024-07-30 PROCEDURE — 7100000010 HC PHASE II RECOVERY - FIRST 15 MIN: Performed by: SPECIALIST

## 2024-07-30 PROCEDURE — 2580000003 HC RX 258: Performed by: STUDENT IN AN ORGANIZED HEALTH CARE EDUCATION/TRAINING PROGRAM

## 2024-07-30 PROCEDURE — 6360000002 HC RX W HCPCS: Performed by: STUDENT IN AN ORGANIZED HEALTH CARE EDUCATION/TRAINING PROGRAM

## 2024-07-30 PROCEDURE — 2500000003 HC RX 250 WO HCPCS: Performed by: STUDENT IN AN ORGANIZED HEALTH CARE EDUCATION/TRAINING PROGRAM

## 2024-07-30 PROCEDURE — 3700000001 HC ADD 15 MINUTES (ANESTHESIA): Performed by: SPECIALIST

## 2024-07-30 PROCEDURE — 7100000011 HC PHASE II RECOVERY - ADDTL 15 MIN: Performed by: SPECIALIST

## 2024-07-30 PROCEDURE — 3700000000 HC ANESTHESIA ATTENDED CARE: Performed by: SPECIALIST

## 2024-07-30 PROCEDURE — 2720000010 HC SURG SUPPLY STERILE: Performed by: SPECIALIST

## 2024-07-30 PROCEDURE — 3600007502: Performed by: SPECIALIST

## 2024-07-30 PROCEDURE — 2709999900 HC NON-CHARGEABLE SUPPLY: Performed by: SPECIALIST

## 2024-07-30 PROCEDURE — 3600007512: Performed by: SPECIALIST

## 2024-07-30 RX ORDER — LIDOCAINE HYDROCHLORIDE 20 MG/ML
INJECTION, SOLUTION EPIDURAL; INFILTRATION; INTRACAUDAL; PERINEURAL PRN
Status: DISCONTINUED | OUTPATIENT
Start: 2024-07-30 | End: 2024-07-30 | Stop reason: SDUPTHER

## 2024-07-30 RX ORDER — SODIUM CHLORIDE 0.9 % (FLUSH) 0.9 %
5-40 SYRINGE (ML) INJECTION EVERY 12 HOURS SCHEDULED
Status: DISCONTINUED | OUTPATIENT
Start: 2024-07-30 | End: 2024-07-30 | Stop reason: HOSPADM

## 2024-07-30 RX ORDER — MULTIVIT-MIN/IRON/FOLIC ACID/K 18-600-40
CAPSULE ORAL
COMMUNITY

## 2024-07-30 RX ORDER — FLUTICASONE PROPIONATE 50 MCG
2 SPRAY, SUSPENSION (ML) NASAL
COMMUNITY

## 2024-07-30 RX ORDER — SODIUM CHLORIDE 9 MG/ML
INJECTION, SOLUTION INTRAVENOUS CONTINUOUS
Status: DISCONTINUED | OUTPATIENT
Start: 2024-07-30 | End: 2024-07-30 | Stop reason: HOSPADM

## 2024-07-30 RX ORDER — SODIUM CHLORIDE, SODIUM LACTATE, POTASSIUM CHLORIDE, CALCIUM CHLORIDE 600; 310; 30; 20 MG/100ML; MG/100ML; MG/100ML; MG/100ML
INJECTION, SOLUTION INTRAVENOUS CONTINUOUS PRN
Status: DISCONTINUED | OUTPATIENT
Start: 2024-07-30 | End: 2024-07-30 | Stop reason: SDUPTHER

## 2024-07-30 RX ORDER — SODIUM CHLORIDE 0.9 % (FLUSH) 0.9 %
5-40 SYRINGE (ML) INJECTION PRN
Status: DISCONTINUED | OUTPATIENT
Start: 2024-07-30 | End: 2024-07-30 | Stop reason: HOSPADM

## 2024-07-30 RX ORDER — SODIUM CHLORIDE 9 MG/ML
25 INJECTION, SOLUTION INTRAVENOUS PRN
Status: DISCONTINUED | OUTPATIENT
Start: 2024-07-30 | End: 2024-07-30 | Stop reason: HOSPADM

## 2024-07-30 RX ADMIN — PROPOFOL 50 MG: 10 INJECTION, EMULSION INTRAVENOUS at 10:30

## 2024-07-30 RX ADMIN — PROPOFOL 50 MG: 10 INJECTION, EMULSION INTRAVENOUS at 10:52

## 2024-07-30 RX ADMIN — PROPOFOL 50 MG: 10 INJECTION, EMULSION INTRAVENOUS at 10:43

## 2024-07-30 RX ADMIN — PROPOFOL 50 MG: 10 INJECTION, EMULSION INTRAVENOUS at 10:48

## 2024-07-30 RX ADMIN — PROPOFOL 50 MG: 10 INJECTION, EMULSION INTRAVENOUS at 10:33

## 2024-07-30 RX ADMIN — PROPOFOL 100 MG: 10 INJECTION, EMULSION INTRAVENOUS at 10:26

## 2024-07-30 RX ADMIN — PROPOFOL 50 MG: 10 INJECTION, EMULSION INTRAVENOUS at 10:41

## 2024-07-30 RX ADMIN — LIDOCAINE HYDROCHLORIDE 40 MG: 20 INJECTION, SOLUTION EPIDURAL; INFILTRATION; INTRACAUDAL; PERINEURAL at 10:26

## 2024-07-30 RX ADMIN — PROPOFOL 50 MG: 10 INJECTION, EMULSION INTRAVENOUS at 10:37

## 2024-07-30 RX ADMIN — SODIUM CHLORIDE, POTASSIUM CHLORIDE, SODIUM LACTATE AND CALCIUM CHLORIDE: 600; 310; 30; 20 INJECTION, SOLUTION INTRAVENOUS at 10:22

## 2024-07-30 ASSESSMENT — PAIN - FUNCTIONAL ASSESSMENT
PAIN_FUNCTIONAL_ASSESSMENT: 0-10
PAIN_FUNCTIONAL_ASSESSMENT: NONE - DENIES PAIN

## 2024-07-30 NOTE — H&P
Pre-endoscopy H and P for Colonoscopy    The patient was seen and examined.Date of last colonoscopy: , Polyps  No      The airway was assessed and documented.  The problem list, past medical history, and medications were reviewed.     Patient Active Problem List   Diagnosis    H/O TB (tuberculosis)    Mixed hyperlipidemia    IGT (impaired glucose tolerance)     Social History     Socioeconomic History    Marital status:      Spouse name: Not on file    Number of children: Not on file    Years of education: Not on file    Highest education level: Not on file   Occupational History    Not on file   Tobacco Use    Smoking status: Former     Current packs/day: 0.00     Types: Cigarettes     Quit date: 2007     Years since quittin.2    Smokeless tobacco: Never   Substance and Sexual Activity    Alcohol use: Yes    Drug use: No    Sexual activity: Not Currently   Other Topics Concern    Not on file   Social History Narrative    Not on file     Social Determinants of Health     Financial Resource Strain: Patient Declined (2024)    Overall Financial Resource Strain (CARDIA)     Difficulty of Paying Living Expenses: Patient declined   Food Insecurity: Patient Declined (2024)    Hunger Vital Sign     Worried About Running Out of Food in the Last Year: Patient declined     Ran Out of Food in the Last Year: Patient declined   Transportation Needs: Unknown (2024)    PRAPARE - Transportation     Lack of Transportation (Medical): Not on file     Lack of Transportation (Non-Medical): Patient declined   Physical Activity: Inactive (2024)    Exercise Vital Sign     Days of Exercise per Week: 0 days     Minutes of Exercise per Session: 0 min   Stress: Not on file   Social Connections: Not on file   Intimate Partner Violence: Not on file   Housing Stability: Unknown (2024)    Housing Stability Vital Sign     Unable to Pay for Housing in the Last Year: Not on file     Number of Places Lived in  the Last Year: Not on file     Unstable Housing in the Last Year: Patient declined     Past Medical History:   Diagnosis Date    Cervical polyp     no HPV    Genital herpes     in past,no flare up    Tuberculosis     TOOK FULL COURSE OF INH    Tumor cells      The patient has a family history of NA    Prior to Admission Medications   Prescriptions Last Dose Informant Patient Reported? Taking?   Cholecalciferol (VITAMIN D) 50 MCG (2000) CAPS capsule Past Week  Yes Yes   Sig: Take by mouth With K dots   NONFORMULARY Past Week  Yes Yes   Sig: [employer] brand vision shield oral for eyes   Omega-3 Fatty Acids (FISH OIL) 300 MG CAPS   Yes No   Sig: Take by mouth   fluticasone (FLONASE) 50 MCG/ACT nasal spray Past Month  Yes No   Si sprays by Nasal route   omeprazole (PRILOSEC) 40 MG delayed release capsule Past Month  No No   Sig: TAKE 1 CAPSULE BY MOUTH EVERY DAY      Facility-Administered Medications: None         The review of systems is:  negative for shortness of breath or chest pain, positive for epigastric pain and family history of colon cancer      The heart, lungs and mental status were satisfactory for the administration of MAC sedation and for the procedure.      Mallampati score: See Anesthesia.    I discussed with the patient the objectives, risks, consequences and alternatives to the procedure.      Plan: Endoscopic procedure with MAC sedation.    Hans Gallardo MD  2024  10:27 AM

## 2024-07-30 NOTE — OP NOTE
[facility]  [address]  [place] [address] [phone]                           Colonoscopy and EGD Procedure Note      Indications:   epigastric pain, colon cancer screening      :  Hans Gallardo MD    Staff: Circulator: Marjorie Gonzalez RN  Endoscopy Technician: Surendra Li    Referring Provider: Danii Mathur MD    Sedation:  MAC anesthesia Propofol    Procedure Details:  After informed consent was obtained with all risks and benefits of procedure explained and pre-operative exam completed, pt was placed in the left lateral decubitus position. Following sequential administration of sedation as per above, the gastroscope was inserted into the mouth and advanced under direct vision to second portion of the duodenum.  A careful inspection was made as the gastroscope was withdrawn, including a retroflexed view of the proximal stomach; findings and interventions are described below.      EGD Findings:  Esophagus:normal  Stomach:normal   Duodenum/jejunum:normal    EGD Interventions:  none    The bed was then turned and upon sequential sedation as per above, a digital rectal exam was performed per below. The Olympus videocolonoscope was inserted in the rectum and carefully advanced to the cecum, which was identified by the ileocecal valve and appendiceal orifice.  The quality of preparation was good. .The colonoscope was slowly withdrawn with careful evaluation between folds. Retroflexion in the rectum was performed.     Colon Findings:   Rectum: normal  Sigmoid: moderate diverticulosis;  Descending Colon: moderate diverticulosis;  Transverse Colon: mild diverticulosis;  Ascending Colon: normal  Cecum: normal    Colonoscopy Interventions:  none           Specimens Removed:  * No specimens in log *    Complications: None.     EBL:      Impression:    See Postoperative diagnosis above    Recommendations:   - If biopsies were obtained, await pathology.    - Resume normal

## 2024-07-30 NOTE — ANESTHESIA POSTPROCEDURE EVALUATION
Department of Anesthesiology  Postprocedure Note    Patient: Eden Castro  MRN: [id number]  YOB: 1955  Date of evaluation: 7/30/2024    Procedure Summary       Date: 07/30/24 Room / Location: [place] [id number] / [facility] ENDOSCOPY    Anesthesia Start: 1022 Anesthesia Stop: 1100    Procedures:       COLONOSCOPY AND ESOPHAGOGASTRODUODENOSCOPY      ESOPHAGOGASTRODUODENOSCOPY Diagnosis:       Epigastric pain      Family history of malignant neoplasm of gastrointestinal tract      Hypercholesteremia      (Epigastric pain [R10.13])      (Family history of malignant neoplasm of gastrointestinal tract [Z80.0])      (Hypercholesteremia [E78.00])    Surgeons: Hans Gallardo MD Responsible Provider: Jamison Gilmore MD    Anesthesia Type: MAC ASA Status: 2            Anesthesia Type: MAC    Sanjuana Phase I: Sanjuana Score: 10    Sanjuana Phase II: Sanjuana Score: 9    Anesthesia Post Evaluation    Patient location during evaluation: bedside  Nausea & Vomiting: no nausea  Cardiovascular status: blood pressure returned to baseline  Respiratory status: acceptable  Hydration status: euvolemic    No notable events documented.

## 2024-07-30 NOTE — ANESTHESIA PRE PROCEDURE
Department of Anesthesiology  Preprocedure Note       Name:  Eden Castro   Age:  69 y.o.  :  1955                                          MRN:  [id number]         Date:  2024      Surgeon: Surgeon(s):  Hans Gallardo MD    Procedure: Procedure(s):  COLONOSCOPY AND ESOPHAGOGASTRODUODENOSCOPY  ESOPHAGOGASTRODUODENOSCOPY    Medications prior to admission:   Prior to Admission medications    Medication Sig Start Date End Date Taking? Authorizing Provider   omeprazole (PRILOSEC) 40 MG delayed release capsule TAKE 1 CAPSULE BY MOUTH EVERY DAY 24   Danii Mathur MD   Omega-3 Fatty Acids (FISH OIL) 300 MG CAPS Take by mouth    Provider, MD Will       Current medications:    No current facility-administered medications for this encounter.       Allergies:    Allergies   Allergen Reactions    Oxycodone-Acetaminophen Rash     After being on it for a month    Aspirin Hives     But can take advil    Penicillins Rash       Problem List:    Patient Active Problem List   Diagnosis Code    H/O TB (tuberculosis) Z86.11    Mixed hyperlipidemia E78.2    IGT (impaired glucose tolerance) R73.02       Past Medical History:        Diagnosis Date    Cervical polyp     no HPV    Genital herpes     in past,no flare up    Tuberculosis     TOOK FULL COURSE OF INH    Tumor cells        Past Surgical History:        Procedure Laterality Date    DILATION AND CURETTAGE OF UTERUS      FOR UTERINE HEMORRHAGE - spontaneous     GYN      A1    GYN  2003    colposcopy - polyps       Social History:    Social History     Tobacco Use    Smoking status: Former     Current packs/day: 0.00     Types: Cigarettes     Quit date: 2007     Years since quittin.2    Smokeless tobacco: Never   Substance Use Topics    Alcohol use: Yes                                Counseling given: Not Answered      Vital Signs (Current): There were no vitals filed for this visit.

## 2024-07-30 NOTE — DISCHARGE INSTRUCTIONS
Eden MCLEAN [place]  [id number]  1955    COLON/EGD DISCHARGE INSTRUCTIONS  Discomfort:  Redness at IV site- apply warm compress to area; if redness or soreness persist- contact your physician  There may be a slight amount of blood passed from the rectum  Gaseous discomfort- walking, belching will help relieve any discomfort  Sore throat- throat lozenges or warm salt water gargle  You may not operate a vehicle for 12 hours  You may not engage in an occupation involving machinery or appliances for rest of today  You may not drink alcoholic beverages for at least 12 hours  Avoid making any critical decisions for at least 24 hour  DIET:   High fiber diet.   - however -  remember your colon is empty and a heavy meal will produce gas.   Avoid these foods:  vegetables, fried / greasy foods, carbonated drinks for today.    MEDICATIONS:      Regarding Aspirin or Nonsteroidal medications, please see below.    ACTIVITY:  You may resume your normal daily activities it is recommended that you spend the remainder of the day resting -  avoid any strenuous activity.    CALL M.D.  ANY SIGN OF:  Increasing pain, nausea, vomiting  Abdominal distension (swelling)  New increased bleeding (oral or rectal)  Fever (chills)  Pain in chest area  Bloody discharge from nose or mouth  Shortness of breath    You may take any Advil, Aspirin, Ibuprofen, Motrin, Aleve, or Goody’s for 10 days, ONLY  Tylenol as needed for pain.    Post procedure diagnosis: Diverticulosis  Post-procedure recommendations: Colon in 5 years    Follow-up Instructions:   Call Dr. Gallardo  Results of procedure / biopsy in 10 days if biopsies were done  Telephone #  [phone]

## 2024-08-31 ENCOUNTER — OFFICE VISIT (OUTPATIENT)
Age: 69
End: 2024-08-31

## 2024-08-31 VITALS
OXYGEN SATURATION: 94 % | WEIGHT: 148 LBS | SYSTOLIC BLOOD PRESSURE: 106 MMHG | RESPIRATION RATE: 18 BRPM | TEMPERATURE: 98.2 F | HEART RATE: 79 BPM | DIASTOLIC BLOOD PRESSURE: 67 MMHG | HEIGHT: 61 IN | BODY MASS INDEX: 27.94 KG/M2
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
## 2024-08-31 DIAGNOSIS — J06.9 VIRAL UPPER RESPIRATORY TRACT INFECTION: Primary | ICD-10-CM

## 2024-08-31 ASSESSMENT — ENCOUNTER SYMPTOMS
VOMITING: 0
SHORTNESS OF BREATH: 0
COUGH: 0
NAUSEA: 0
SINUS PRESSURE: 1

## 2024-08-31 NOTE — PATIENT INSTRUCTIONS
Patient will treat symptoms with over-the-counter Zyrtec as well as some Mucinex D and follow-up as needed with PCP.  As far as the bug bite is concerned it is a small area of erythema most likely a histamine response recommend over-the-counter Zyrtec as well as some topical hydrocortisone if it itches and follow-up as needed

## 2024-08-31 NOTE — PROGRESS NOTES
Subjective     Chief Complaint   Patient presents with    URI     Patient has URI along with bug bite on left arm. Patient is having sinus pressure. Symptoms started yesterday. Patient refused any testing.         URI   Associated symptoms include congestion. Pertinent negatives include no coughing, nausea or vomiting.    69-year-old female who presents for sinus congestion and drainage going on since yesterday not accompanied fever chills nausea vomiting no specific treatment for this also states having a bug bite on her left arm that she noticed there yesterday's been locally itching no specific treatment.    Past Medical History:   Diagnosis Date    Cervical polyp     no HPV    Genital herpes     in past,no flare up    Tuberculosis     TOOK FULL COURSE OF INH    Tumor cells        Past Surgical History:   Procedure Laterality Date    COLONOSCOPY      COLONOSCOPY N/A 2024    COLONOSCOPY AND ESOPHAGOGASTRODUODENOSCOPY performed by Hans Gallardo MD at [facility] ENDOSCOPY    DILATION AND CURETTAGE OF UTERUS  1970s    FOR UTERINE HEMORRHAGE - spontaneous     GYN      A1    GYN  2003    colposcopy - polyps    UPPER GASTROINTESTINAL ENDOSCOPY N/A 2024    ESOPHAGOGASTRODUODENOSCOPY performed by Hans Gallardo MD at [facility] ENDOSCOPY       Family History   Problem Relation Age of Onset    Hypertension Mother     High Cholesterol Mother     Diabetes Mother     Cancer Mother         basal cell skin ca    Heart Failure Father         rheumatoid heart disease    Heart Disease Father     Cancer Sister     Thyroid Disease Sister         hypothyroid       Allergies   Allergen Reactions    Oxycodone-Acetaminophen Rash     After being on it for a month    Aspirin Hives     But can take advil    Penicillins Rash       Social History     Tobacco Use    Smoking status: Former     Current packs/day: 0.00     Types: Cigarettes     Quit date: 2007     Years since quittin.3    Smokeless tobacco: Never

## 2024-10-28 DIAGNOSIS — K21.9 GASTROESOPHAGEAL REFLUX DISEASE WITHOUT ESOPHAGITIS: ICD-10-CM

## 2024-10-28 RX ORDER — OMEPRAZOLE 40 MG/1
CAPSULE, DELAYED RELEASE ORAL DAILY
Qty: 90 CAPSULE | Refills: 0 | Status: SHIPPED | OUTPATIENT
Start: 2024-10-28

## (undated) DEVICE — ORISE PROKNIFE 1.5 MM ELECTRODE: Brand: ORISE™ PROKNIFE

## (undated) DEVICE — ORISE PROKNIFE 3.0 MM ELECTRODE: Brand: ORISE™ PROKNIFE

## (undated) DEVICE — SUPPLEMENT DIGESTIVE H2O SOL GI-EASE